# Patient Record
Sex: MALE | Race: WHITE | NOT HISPANIC OR LATINO | Employment: OTHER | URBAN - METROPOLITAN AREA
[De-identification: names, ages, dates, MRNs, and addresses within clinical notes are randomized per-mention and may not be internally consistent; named-entity substitution may affect disease eponyms.]

---

## 2023-09-17 ENCOUNTER — HOSPITAL ENCOUNTER (OUTPATIENT)
Facility: MEDICAL CENTER | Age: 78
End: 2023-09-20
Attending: HOSPITALIST | Admitting: HOSPITALIST
Payer: MEDICARE

## 2023-09-17 PROBLEM — I48.91 ATRIAL FIBRILLATION WITH RAPID VENTRICULAR RESPONSE (HCC): Status: ACTIVE | Noted: 2023-09-17

## 2023-09-17 PROCEDURE — 83735 ASSAY OF MAGNESIUM: CPT | Mod: 91

## 2023-09-17 PROCEDURE — 85025 COMPLETE CBC W/AUTO DIFF WBC: CPT | Mod: 91

## 2023-09-17 PROCEDURE — 84443 ASSAY THYROID STIM HORMONE: CPT | Mod: 91

## 2023-09-17 PROCEDURE — 93005 ELECTROCARDIOGRAM TRACING: CPT | Performed by: STUDENT IN AN ORGANIZED HEALTH CARE EDUCATION/TRAINING PROGRAM

## 2023-09-17 PROCEDURE — 99223 1ST HOSP IP/OBS HIGH 75: CPT | Mod: AI | Performed by: STUDENT IN AN ORGANIZED HEALTH CARE EDUCATION/TRAINING PROGRAM

## 2023-09-17 PROCEDURE — 770020 HCHG ROOM/CARE - TELE (206)

## 2023-09-17 PROCEDURE — 84484 ASSAY OF TROPONIN QUANT: CPT | Mod: 91

## 2023-09-17 PROCEDURE — 80053 COMPREHEN METABOLIC PANEL: CPT | Mod: 91

## 2023-09-17 PROCEDURE — 84439 ASSAY OF FREE THYROXINE: CPT

## 2023-09-17 PROCEDURE — 36415 COLL VENOUS BLD VENIPUNCTURE: CPT

## 2023-09-17 PROCEDURE — 85055 RETICULATED PLATELET ASSAY: CPT

## 2023-09-17 RX ORDER — METOPROLOL TARTRATE 1 MG/ML
5 INJECTION, SOLUTION INTRAVENOUS
Status: DISCONTINUED | OUTPATIENT
Start: 2023-09-17 | End: 2023-09-20 | Stop reason: HOSPADM

## 2023-09-17 RX ORDER — POLYETHYLENE GLYCOL 3350 17 G/17G
1 POWDER, FOR SOLUTION ORAL
Status: DISCONTINUED | OUTPATIENT
Start: 2023-09-17 | End: 2023-09-18

## 2023-09-17 RX ORDER — DEXTROSE MONOHYDRATE 25 G/50ML
25 INJECTION, SOLUTION INTRAVENOUS
Status: DISCONTINUED | OUTPATIENT
Start: 2023-09-17 | End: 2023-09-20 | Stop reason: HOSPADM

## 2023-09-17 RX ORDER — AMOXICILLIN 250 MG
2 CAPSULE ORAL 2 TIMES DAILY
Status: DISCONTINUED | OUTPATIENT
Start: 2023-09-17 | End: 2023-09-18

## 2023-09-17 RX ORDER — BISACODYL 10 MG
10 SUPPOSITORY, RECTAL RECTAL
Status: DISCONTINUED | OUTPATIENT
Start: 2023-09-17 | End: 2023-09-18

## 2023-09-17 ASSESSMENT — COGNITIVE AND FUNCTIONAL STATUS - GENERAL
DRESSING REGULAR UPPER BODY CLOTHING: A LITTLE
DRESSING REGULAR LOWER BODY CLOTHING: A LITTLE
DAILY ACTIVITIY SCORE: 20
MOVING FROM LYING ON BACK TO SITTING ON SIDE OF FLAT BED: A LITTLE
TOILETING: A LITTLE
MOBILITY SCORE: 18
SUGGESTED CMS G CODE MODIFIER DAILY ACTIVITY: CJ
SUGGESTED CMS G CODE MODIFIER MOBILITY: CK
CLIMB 3 TO 5 STEPS WITH RAILING: A LOT
WALKING IN HOSPITAL ROOM: A LITTLE
STANDING UP FROM CHAIR USING ARMS: A LITTLE
HELP NEEDED FOR BATHING: A LITTLE
MOVING TO AND FROM BED TO CHAIR: A LITTLE

## 2023-09-17 ASSESSMENT — PATIENT HEALTH QUESTIONNAIRE - PHQ9
2. FEELING DOWN, DEPRESSED, IRRITABLE, OR HOPELESS: NOT AT ALL
1. LITTLE INTEREST OR PLEASURE IN DOING THINGS: NOT AT ALL
SUM OF ALL RESPONSES TO PHQ9 QUESTIONS 1 AND 2: 0

## 2023-09-17 NOTE — PROGRESS NOTES
[facility] DIRECT ADMISSION REPORT  Transferring facility: [facility]  Transferring physician: Dr Belem Puri      Chief complaint: Atrial fibrillation with rapid ventricular response, concern for V. tach, heart block,   Pertinent history & patient course: 77 years old male with a past medical history of atrial fibrillation, diabetes, hypothyroidism, hypertension who presented with abdominal pain generalized weakness, lethargy and palpitations who was found to be having atrial fibrillation/flutter with rapid ventricular response with heart rate reaching up to 205.  Heart rate did improve with metoprolol pushes, lethargy, and abdominal pain resolved after heart rate improved.  The patient has been started on Zosyn for potential ischemic/infectious colitis.  CT abdomen non diagnostic.        Code Status: FULL per transferring provider, I personally verified with the transferring provider patient's code status and the transferring provider has confirmed this with the patient.  Reason for Transfer: Needed inpatient cardiology consultation  Further work up or recommendations requested prior to transfer: Infectious work-up, including rule out C. difficile colitis, check UA, check chest x-ray     Patient accepted for transfer: Yes  Accepting [facility] Facility: [facility] to notify the Triage Coordinator in the RTOC via Voalte or Phone ext. [phone] when patient arrives to the unit. The Triage Coordinator will assign the admitting provider.     Consultants to be called upon arrival: Cardiology  Admission status: Inpatient.   Floor requested: Telemetry     The admitting provider is the point of contact for questions or concerns regarding patient's care.

## 2023-09-18 ENCOUNTER — APPOINTMENT (OUTPATIENT)
Dept: RADIOLOGY | Facility: MEDICAL CENTER | Age: 78
End: 2023-09-18
Attending: STUDENT IN AN ORGANIZED HEALTH CARE EDUCATION/TRAINING PROGRAM
Payer: MEDICARE

## 2023-09-18 PROBLEM — C90.00 MULTIPLE MYELOMA (HCC): Status: ACTIVE | Noted: 2023-09-18

## 2023-09-18 PROBLEM — R19.7 DIARRHEA: Status: ACTIVE | Noted: 2023-09-18

## 2023-09-18 PROBLEM — I50.22 CHRONIC SYSTOLIC CONGESTIVE HEART FAILURE (HCC): Status: ACTIVE | Noted: 2023-09-18

## 2023-09-18 PROBLEM — R11.2 NAUSEA & VOMITING: Status: ACTIVE | Noted: 2023-09-18

## 2023-09-18 PROBLEM — E11.9 DIABETES MELLITUS (HCC): Status: ACTIVE | Noted: 2023-09-18

## 2023-09-18 LAB
ALBUMIN SERPL BCP-MCNC: 3.4 G/DL (ref 3.2–4.9)
ALBUMIN/GLOB SERPL: 1.2 G/DL
ALP SERPL-CCNC: 97 U/L (ref 30–99)
ALT SERPL-CCNC: 8 U/L (ref 2–50)
ANION GAP SERPL CALC-SCNC: 12 MMOL/L (ref 7–16)
ANION GAP SERPL CALC-SCNC: 15 MMOL/L (ref 7–16)
AST SERPL-CCNC: 15 U/L (ref 12–45)
BASOPHILS # BLD AUTO: 0.5 % (ref 0–1.8)
BASOPHILS # BLD: 0.03 K/UL (ref 0–0.12)
BILIRUB SERPL-MCNC: 2.3 MG/DL (ref 0.1–1.5)
BUN SERPL-MCNC: 33 MG/DL (ref 8–22)
BUN SERPL-MCNC: 37 MG/DL (ref 8–22)
CALCIUM ALBUM COR SERPL-MCNC: 8.8 MG/DL (ref 8.5–10.5)
CALCIUM SERPL-MCNC: 8.2 MG/DL (ref 8.5–10.5)
CALCIUM SERPL-MCNC: 8.3 MG/DL (ref 8.5–10.5)
CHLORIDE SERPL-SCNC: 111 MMOL/L (ref 96–112)
CHLORIDE SERPL-SCNC: 112 MMOL/L (ref 96–112)
CO2 SERPL-SCNC: 14 MMOL/L (ref 20–33)
CO2 SERPL-SCNC: 15 MMOL/L (ref 20–33)
CREAT SERPL-MCNC: 1.43 MG/DL (ref 0.5–1.4)
CREAT SERPL-MCNC: 1.62 MG/DL (ref 0.5–1.4)
EKG IMPRESSION: NORMAL
EOSINOPHIL # BLD AUTO: 0.07 K/UL (ref 0–0.51)
EOSINOPHIL NFR BLD: 1.2 % (ref 0–6.9)
ERYTHROCYTE [DISTWIDTH] IN BLOOD BY AUTOMATED COUNT: 55.9 FL (ref 35.9–50)
GFR SERPLBLD CREATININE-BSD FMLA CKD-EPI: 43 ML/MIN/1.73 M 2
GFR SERPLBLD CREATININE-BSD FMLA CKD-EPI: 50 ML/MIN/1.73 M 2
GLOBULIN SER CALC-MCNC: 2.9 G/DL (ref 1.9–3.5)
GLUCOSE BLD STRIP.AUTO-MCNC: 125 MG/DL (ref 65–99)
GLUCOSE BLD STRIP.AUTO-MCNC: 131 MG/DL (ref 65–99)
GLUCOSE BLD STRIP.AUTO-MCNC: 131 MG/DL (ref 65–99)
GLUCOSE BLD STRIP.AUTO-MCNC: 86 MG/DL (ref 65–99)
GLUCOSE SERPL-MCNC: 111 MG/DL (ref 65–99)
GLUCOSE SERPL-MCNC: 116 MG/DL (ref 65–99)
HCT VFR BLD AUTO: 37.9 % (ref 42–52)
HGB BLD-MCNC: 12.6 G/DL (ref 14–18)
IMM GRANULOCYTES # BLD AUTO: 0.04 K/UL (ref 0–0.11)
IMM GRANULOCYTES NFR BLD AUTO: 0.7 % (ref 0–0.9)
LACTATE SERPL-SCNC: 1.5 MMOL/L (ref 0.5–2)
LYMPHOCYTES # BLD AUTO: 0.48 K/UL (ref 1–4.8)
LYMPHOCYTES NFR BLD: 7.9 % (ref 22–41)
MAGNESIUM SERPL-MCNC: 1.8 MG/DL (ref 1.5–2.5)
MAGNESIUM SERPL-MCNC: 2.6 MG/DL (ref 1.5–2.5)
MCH RBC QN AUTO: 34.9 PG (ref 27–33)
MCHC RBC AUTO-ENTMCNC: 33.2 G/DL (ref 32.3–36.5)
MCV RBC AUTO: 105 FL (ref 81.4–97.8)
MONOCYTES # BLD AUTO: 0.36 K/UL (ref 0–0.85)
MONOCYTES NFR BLD AUTO: 5.9 % (ref 0–13.4)
NEUTROPHILS # BLD AUTO: 5.08 K/UL (ref 1.82–7.42)
NEUTROPHILS NFR BLD: 83.8 % (ref 44–72)
NRBC # BLD AUTO: 0 K/UL
NRBC BLD-RTO: 0 /100 WBC (ref 0–0.2)
PLATELET # BLD AUTO: 70 K/UL (ref 164–446)
PLATELETS.RETICULATED NFR BLD AUTO: 3.8 % (ref 0.6–13.1)
PMV BLD AUTO: 11.4 FL (ref 9–12.9)
POTASSIUM SERPL-SCNC: 4 MMOL/L (ref 3.6–5.5)
POTASSIUM SERPL-SCNC: 4.1 MMOL/L (ref 3.6–5.5)
PROCALCITONIN SERPL-MCNC: 0.29 NG/ML
PROT SERPL-MCNC: 6.3 G/DL (ref 6–8.2)
RBC # BLD AUTO: 3.61 M/UL (ref 4.7–6.1)
SODIUM SERPL-SCNC: 139 MMOL/L (ref 135–145)
SODIUM SERPL-SCNC: 140 MMOL/L (ref 135–145)
T4 FREE SERPL-MCNC: 1.46 NG/DL (ref 0.93–1.7)
TROPONIN T SERPL-MCNC: 22 NG/L (ref 6–19)
TROPONIN T SERPL-MCNC: 26 NG/L (ref 6–19)
TROPONIN T SERPL-MCNC: 26 NG/L (ref 6–19)
TSH SERPL DL<=0.005 MIU/L-ACNC: 0.61 UIU/ML (ref 0.38–5.33)
WBC # BLD AUTO: 6.1 K/UL (ref 4.8–10.8)

## 2023-09-18 PROCEDURE — 83605 ASSAY OF LACTIC ACID: CPT

## 2023-09-18 PROCEDURE — 80048 BASIC METABOLIC PNL TOTAL CA: CPT

## 2023-09-18 PROCEDURE — 700111 HCHG RX REV CODE 636 W/ 250 OVERRIDE (IP): Mod: JZ | Performed by: STUDENT IN AN ORGANIZED HEALTH CARE EDUCATION/TRAINING PROGRAM

## 2023-09-18 PROCEDURE — 770020 HCHG ROOM/CARE - TELE (206)

## 2023-09-18 PROCEDURE — 87046 STOOL CULTR AEROBIC BACT EA: CPT

## 2023-09-18 PROCEDURE — 83735 ASSAY OF MAGNESIUM: CPT

## 2023-09-18 PROCEDURE — 84484 ASSAY OF TROPONIN QUANT: CPT | Mod: 91

## 2023-09-18 PROCEDURE — 87045 FECES CULTURE AEROBIC BACT: CPT

## 2023-09-18 PROCEDURE — 700102 HCHG RX REV CODE 250 W/ 637 OVERRIDE(OP): Performed by: STUDENT IN AN ORGANIZED HEALTH CARE EDUCATION/TRAINING PROGRAM

## 2023-09-18 PROCEDURE — 87899 AGENT NOS ASSAY W/OPTIC: CPT

## 2023-09-18 PROCEDURE — 700105 HCHG RX REV CODE 258: Performed by: STUDENT IN AN ORGANIZED HEALTH CARE EDUCATION/TRAINING PROGRAM

## 2023-09-18 PROCEDURE — 71045 X-RAY EXAM CHEST 1 VIEW: CPT

## 2023-09-18 PROCEDURE — 84145 PROCALCITONIN (PCT): CPT

## 2023-09-18 PROCEDURE — A9270 NON-COVERED ITEM OR SERVICE: HCPCS | Performed by: STUDENT IN AN ORGANIZED HEALTH CARE EDUCATION/TRAINING PROGRAM

## 2023-09-18 PROCEDURE — 82962 GLUCOSE BLOOD TEST: CPT

## 2023-09-18 PROCEDURE — 99233 SBSQ HOSP IP/OBS HIGH 50: CPT | Mod: GC | Performed by: INTERNAL MEDICINE

## 2023-09-18 PROCEDURE — 93010 ELECTROCARDIOGRAM REPORT: CPT | Performed by: INTERNAL MEDICINE

## 2023-09-18 PROCEDURE — 36415 COLL VENOUS BLD VENIPUNCTURE: CPT

## 2023-09-18 RX ORDER — CALCITRIOL 0.25 UG/1
0.25 CAPSULE, LIQUID FILLED ORAL DAILY
Status: DISCONTINUED | OUTPATIENT
Start: 2023-09-18 | End: 2023-09-20 | Stop reason: HOSPADM

## 2023-09-18 RX ORDER — LORAZEPAM 2 MG/ML
0.5 INJECTION INTRAMUSCULAR EVERY 4 HOURS PRN
Status: DISCONTINUED | OUTPATIENT
Start: 2023-09-18 | End: 2023-09-20 | Stop reason: HOSPADM

## 2023-09-18 RX ORDER — LOPERAMIDE HYDROCHLORIDE 2 MG/1
2 CAPSULE ORAL 4 TIMES DAILY PRN
Status: DISCONTINUED | OUTPATIENT
Start: 2023-09-18 | End: 2023-09-20 | Stop reason: HOSPADM

## 2023-09-18 RX ORDER — ONDANSETRON 2 MG/ML
4 INJECTION INTRAMUSCULAR; INTRAVENOUS EVERY 4 HOURS PRN
Status: DISCONTINUED | OUTPATIENT
Start: 2023-09-18 | End: 2023-09-18

## 2023-09-18 RX ORDER — MAGNESIUM SULFATE HEPTAHYDRATE 40 MG/ML
2 INJECTION, SOLUTION INTRAVENOUS ONCE
Status: COMPLETED | OUTPATIENT
Start: 2023-09-18 | End: 2023-09-18

## 2023-09-18 RX ORDER — BUDESONIDE AND FORMOTEROL FUMARATE DIHYDRATE 160; 4.5 UG/1; UG/1
2 AEROSOL RESPIRATORY (INHALATION) 2 TIMES DAILY
Status: DISCONTINUED | OUTPATIENT
Start: 2023-09-18 | End: 2023-09-18

## 2023-09-18 RX ORDER — LEVOTHYROXINE SODIUM 0.07 MG/1
75 TABLET ORAL DAILY
Status: DISCONTINUED | OUTPATIENT
Start: 2023-09-18 | End: 2023-09-20 | Stop reason: HOSPADM

## 2023-09-18 RX ORDER — ALLOPURINOL 300 MG/1
150 TABLET ORAL DAILY
Status: DISCONTINUED | OUTPATIENT
Start: 2023-09-18 | End: 2023-09-20 | Stop reason: HOSPADM

## 2023-09-18 RX ORDER — ATORVASTATIN CALCIUM 80 MG/1
80 TABLET, FILM COATED ORAL DAILY
Status: DISCONTINUED | OUTPATIENT
Start: 2023-09-18 | End: 2023-09-20 | Stop reason: HOSPADM

## 2023-09-18 RX ORDER — SODIUM CHLORIDE, SODIUM LACTATE, POTASSIUM CHLORIDE, CALCIUM CHLORIDE 600; 310; 30; 20 MG/100ML; MG/100ML; MG/100ML; MG/100ML
INJECTION, SOLUTION INTRAVENOUS ONCE
Status: COMPLETED | OUTPATIENT
Start: 2023-09-18 | End: 2023-09-18

## 2023-09-18 RX ORDER — METOPROLOL SUCCINATE 50 MG/1
50 TABLET, EXTENDED RELEASE ORAL DAILY
Status: DISCONTINUED | OUTPATIENT
Start: 2023-09-18 | End: 2023-09-20 | Stop reason: HOSPADM

## 2023-09-18 RX ORDER — GABAPENTIN 400 MG/1
400 CAPSULE ORAL EVERY EVENING
Status: DISCONTINUED | OUTPATIENT
Start: 2023-09-18 | End: 2023-09-20 | Stop reason: HOSPADM

## 2023-09-18 RX ADMIN — APIXABAN 5 MG: 5 TABLET, FILM COATED ORAL at 17:40

## 2023-09-18 RX ADMIN — MAGNESIUM SULFATE HEPTAHYDRATE 2 G: 2 INJECTION, SOLUTION INTRAVENOUS at 04:00

## 2023-09-18 RX ADMIN — GABAPENTIN 400 MG: 400 CAPSULE ORAL at 17:40

## 2023-09-18 RX ADMIN — ATORVASTATIN CALCIUM 80 MG: 80 TABLET, FILM COATED ORAL at 05:09

## 2023-09-18 RX ADMIN — MOMETASONE FUROATE AND FORMOTEROL FUMARATE DIHYDRATE 2 PUFF: 200; 5 AEROSOL RESPIRATORY (INHALATION) at 17:43

## 2023-09-18 RX ADMIN — CALCITRIOL CAPSULES 0.25 MCG 0.25 MCG: 0.25 CAPSULE ORAL at 05:09

## 2023-09-18 RX ADMIN — LEVOTHYROXINE SODIUM 75 MCG: 0.07 TABLET ORAL at 05:09

## 2023-09-18 RX ADMIN — APIXABAN 5 MG: 5 TABLET, FILM COATED ORAL at 01:28

## 2023-09-18 RX ADMIN — SODIUM CHLORIDE, POTASSIUM CHLORIDE, SODIUM LACTATE AND CALCIUM CHLORIDE: 600; 310; 30; 20 INJECTION, SOLUTION INTRAVENOUS at 04:00

## 2023-09-18 RX ADMIN — METOPROLOL SUCCINATE 50 MG: 50 TABLET, EXTENDED RELEASE ORAL at 05:09

## 2023-09-18 RX ADMIN — ALLOPURINOL 150 MG: 300 TABLET ORAL at 05:09

## 2023-09-18 ASSESSMENT — ENCOUNTER SYMPTOMS
ORTHOPNEA: 0
NERVOUS/ANXIOUS: 0
DIZZINESS: 0
CHILLS: 0
BLOOD IN STOOL: 0
NAUSEA: 1
SHORTNESS OF BREATH: 0
FEVER: 0
VOMITING: 0
HEADACHES: 0
VOMITING: 1
BLURRED VISION: 0
ABDOMINAL PAIN: 1
PALPITATIONS: 0
ABDOMINAL PAIN: 0
DIARRHEA: 1
NAUSEA: 0
COUGH: 0

## 2023-09-18 ASSESSMENT — CHA2DS2 SCORE
VASCULAR DISEASE: NO
AGE 75 OR GREATER: YES
CHA2DS2 VASC SCORE: 4
SEX: MALE
PRIOR STROKE OR TIA OR THROMBOEMBOLISM: NO
HYPERTENSION: NO
DIABETES: YES
CHF OR LEFT VENTRICULAR DYSFUNCTION: YES
AGE 65 TO 74: NO

## 2023-09-18 ASSESSMENT — LIFESTYLE VARIABLES
TOTAL SCORE: 0
ON A TYPICAL DAY WHEN YOU DRINK ALCOHOL HOW MANY DRINKS DO YOU HAVE: 0
TOTAL SCORE: 0
HAVE YOU EVER FELT YOU SHOULD CUT DOWN ON YOUR DRINKING: NO
AVERAGE NUMBER OF DAYS PER WEEK YOU HAVE A DRINK CONTAINING ALCOHOL: 0
CONSUMPTION TOTAL: NEGATIVE
DOES PATIENT WANT TO STOP DRINKING: NO
EVER HAD A DRINK FIRST THING IN THE MORNING TO STEADY YOUR NERVES TO GET RID OF A HANGOVER: NO
TOTAL SCORE: 0
HOW MANY TIMES IN THE PAST YEAR HAVE YOU HAD 5 OR MORE DRINKS IN A DAY: 0
ALCOHOL_USE: NO
EVER FELT BAD OR GUILTY ABOUT YOUR DRINKING: NO
HAVE PEOPLE ANNOYED YOU BY CRITICIZING YOUR DRINKING: NO

## 2023-09-18 ASSESSMENT — FIBROSIS 4 INDEX
FIB4 SCORE: 5.76
FIB4 SCORE: 5.76

## 2023-09-18 NOTE — ASSESSMENT & PLAN NOTE
Patient reports he has high-output ostomy diarrhea since partial colectomy. He states the diarrhea is now at baseline. C.diff (-).  - Loperamide PRN  - Encourage PO intake   - Replete electrolytes as needed

## 2023-09-18 NOTE — ASSESSMENT & PLAN NOTE
Currently not in exacerbation.     - Monitor output  - Continue telemetry monitoring  - Echo  - Continue to hold Entresto and Lasix due to ongoing diarrhea, reconsider tomorrow

## 2023-09-18 NOTE — HOSPITAL COURSE
Jasper Sears is a 77 y.o. male past medical history of CHF with reduced ejection fraction of 25%, diabetes, multiple myeloma (s/p chemotherapy) who presented on 9/17/2023 from an outside facility with lower abdominal pain associated with nausea, vomiting and diarrhea. In the ER at outside facility, he had atrial fibrillation with rapid ventricular response with reports of a flutter and runs of V. tach.  CT of the abdomen pelvis done did not reveal acute abdominal pathology.

## 2023-09-18 NOTE — CARE PLAN
Problem: Knowledge Deficit - Standard  Goal: Patient and family/care givers will demonstrate understanding of plan of care, disease process/condition, diagnostic tests and medications  Outcome: Progressing     Problem: Fall Risk  Goal: Patient will remain free from falls  Outcome: Progressing     Problem: Self Care  Goal: Patient will have the ability to perform ADLs independently or with assistance (bathe, groom, dress, toilet and feed)  Outcome: Progressing   The patient is Stable - Low risk of patient condition declining or worsening    Shift Goals  Clinical Goals: monitor tele  Patient Goals: plan of care  Family Goals: plan of care    Progress made toward(s) clinical / shift goals:  yes    Patient is not progressing towards the following goals:

## 2023-09-18 NOTE — PROGRESS NOTES
Monitor Summary:     Rhythm: Accel Junct  Rate: 96-97  Ectopy: PVC, Couplets  Measurements: --/0.12/0.47      No strip uploaded     12 Hour Chart Check

## 2023-09-18 NOTE — CARE PLAN
The patient is Stable - Low risk of patient condition declining or worsening    Shift Goals  Clinical Goals: Monitor VS/Labs    Progress made toward(s) clinical / shift goals:    Problem: Knowledge Deficit - Standard  Goal: Patient and family/care givers will demonstrate understanding of plan of care, disease process/condition, diagnostic tests and medications  Outcome: Progressing     Problem: Psychosocial  Goal: Patient's level of anxiety will decrease  Outcome: Progressing  Goal: Patient's ability to verbalize feelings about condition will improve  Outcome: Progressing  Goal: Patient's ability to re-evaluate and adapt role responsibilities will improve  Outcome: Progressing  Goal: Patient and family will demonstrate ability to cope with life altering diagnosis and/or procedure  Outcome: Progressing  Goal: Spiritual and cultural needs incorporated into hospitalization  Outcome: Progressing     Problem: Fall Risk  Goal: Patient will remain free from falls  Outcome: Progressing       Patient is not progressing towards the following goals:

## 2023-09-18 NOTE — H&P
Hospital Medicine History & Physical Note    Date of Service  9/17/2023    Primary Care Physician  Pcp Pt States None    Consultants  None    Code Status  Full Code    Chief Complaint  Abdominal pain, a fib with rvr      History of Presenting Illness  Jasper Sears is a 77 y.o. male past medical history of CHF with reduced ejection fraction, diabetes, multiple myeloma who presented 9/17/2023 at outside facility with lower abdominal pain with associated nausea, vomiting and diarrhea.  Patient is visiting from [place] at [place].  Wife reports today they were supposed to check out however patient was too weak and fatigued.  She reports he was recently started on farxiga and entresto.  He was previously on amiodarone which was stopped by his cardiologist as his A-fib was in control.  He has not taken any of his medications today due to intractable nausea and vomiting.  Denies any fevers or chills.    In the ER at outside facility he had atrial fibrillation with rapid ventricular response with reports of a flutter and runs of V. tach.  CT of the abdomen pelvis done did not reveal acute abdominal pathology.  Reportedly C. difficile PCR sent.  He was given 1 L IV fluids, IV magnesium, IV Zosyn 4.5 g and amiodarone 150 mg bolus with metoprolol IV pushes.      Patient examined bedside is sleepy however heart rate is controlled at a rate of 97 with ST depressions in leads V3-V6. He currently denies any chest pain and reports improvement in symptoms.     I discussed the plan of care with patient, family, and ER physician .    Review of Systems  Review of Systems   Gastrointestinal:  Positive for abdominal pain, diarrhea, nausea and vomiting.       Past Medical History   has a past medical history of A-fib (HCC), Congestive heart failure (HCC), Diabetes (HCC), Multiple myeloma (HCC), and Sleep apnea.    Surgical History   has no past surgical history on file.     Family History  family history is not on file.    Family history reviewed with patient. There is no family history that is pertinent to the chief complaint.     Social History   reports that he has never smoked. He has never used smokeless tobacco. He reports that he does not drink alcohol and does not use drugs.    Allergies  Allergies   Allergen Reactions    Tolmetin Unspecified     Other reaction(s): Other (See Comments)  Other reaction(s): Other (See Comments)  Drop  In platelets  CKD  Other reaction(s): Thrombocytopenia  Drop  In platelets  CKD  Drop  In platelets  CKD  Drop  In platelets  CKD      2,4-D Dimethylamine, Amisol Unspecified     Other reaction(s): Unknown/Patient and Family Unable to Define      Diagnostic X-Ray Materials Unspecified     Other reaction(s): Other (See Comments)  Has only 1 functioning kidney  Other reaction(s): Other (See Comments)  Has only 1 functioning kidney  Has only 1 functioning kidney  Other reaction(s): Other (See Comments)  Has only 1 functioning kidney  Has only 1 functioning kidney  Has only 1 functioning kidney  Other reaction(s): Other (See Comments)  Has only 1 functioning kidney  Other reaction(s): Other (See Comments)  Has only 1 functioning kidney  Has only 1 functioning kidney  Has only 1 functioning kidney      Ketorolac      Other reaction(s): Other (See Comments), Other (See Comments)  Other reaction(s): Other (See Comments)  CKD, thrombocytopenia  CKD, thrombocytopenia  Other reaction(s): Other (See Comments)  CKD, thrombocytopenia  CKD, thrombocytopenia  CKD, thrombocytopenia  CKD, thrombocytopenia  Other reaction(s): Other (See Comments)  CKD, thrombocytopenia  CKD, thrombocytopenia  CKD, thrombocytopenia  CKD, thrombocytopenia      Minocycline Unspecified     Other reaction(s): Delerium/Confusion/Psychosis, Other (See Comments)  Other reaction(s): Other (See Comments)  Severe dizziness  Severe dizziness  Severe dizziness  Severe dizziness  Severe dizziness  Severe dizziness  Severe dizziness  Severe  dizziness  Severe dizziness  Severe dizziness      Salicylates Unspecified     Pt states avoid due to blood thinning properties  Pt states avoid due to blood thinning properties      Diphenhydramine      Per pt wife can take orally, but IV was negative in hosptial   Per pt wife can take orally, but IV was negative in hosptial       Metoclopramide      Per pt wife  Per pt wife      Nsaids      intolerance    Ondansetron      Per pt wife   Per pt wife       Prochlorperazine      Per pt wife  Per pt wife      Sulfamethoxazole W-Trimethoprim Unspecified     Other reaction(s): Other (See Comments)  Other reaction(s): Other (See Comments)  Hypoglycemia  Hypoglycemia  Hypoglycemia  Hypoglycemia         Medications  Prior to Admission Medications   Prescriptions Last Dose Informant Patient Reported? Taking?   Calcium Carbonate 1500 (600 Ca) MG Tab   Yes No   Sig: Take 1,500 mg by mouth.   Darbepoetin Mathew 10 MCG/0.4ML Solution Prefilled Syringe   Yes No   Sig: Inject 100 mcg under the skin.   Icosapent Ethyl 1 g Cap   Yes No   Sig: Take 2 g by mouth.   Lenalidomide 10 MG Cap   Yes No   Sig: Take 5 mg by mouth every day.   MELATONIN PO   Yes No   Sig: Take 5 mg by mouth.   Magnesium 400 MG Tab   Yes No   Sig: Take 1 Tablet by mouth 2 times a day.   SITagliptin (JANUVIA) 100 MG Tab   Yes No   Sig: Take 1 tablet (100 mg total) by mouth daily.   SITagliptin (JANUVIA) 50 MG Tab   Yes No   Sig: Take 50 mg by mouth every day.   acyclovir (ZOVIRAX) 400 MG tablet   Yes No   Sig: Take 400 mg by mouth.   allopurinol (ZYLOPRIM) 100 MG Tab   Yes No   Sig: Take 150 mg by mouth every day.   allopurinol (ZYLOPRIM) 300 MG Tab   Yes No   Sig: Take 150 mg by mouth every day.   amiodarone (CORDARONE) 100 MG tablet   Yes No   Sig: Take 100 mg by mouth every day.   apixaban (ELIQUIS) 5mg Tab   Yes No   Sig: Take 5 mg by mouth.   atorvastatin (LIPITOR) 40 MG Tab   Yes No   Sig: Take 80 mg by mouth every day.   atorvastatin (LIPITOR) 80 MG tablet    Yes No   Sig: Take 80 mg by mouth.   budesonide-formoterol (SYMBICORT) 160-4.5 MCG/ACT Aerosol   Yes No   Sig: Inhale 2 Puffs.   budesonide-formoterol (SYMBICORT) 160-4.5 MCG/ACT Aerosol   Yes No   Sig: Inhale 2 puffs into the lungs 2 (two) times a day.   calcitRIOL (ROCALTROL) 0.25 MCG Cap   Yes No   Sig: Take 0.25 mcg by mouth every day.   cyanocobalamin (VITAMIN B12) 1000 MCG Tab   Yes No   Sig: Take 500 mcg by mouth every day.   cyanocobalamin (VITAMIN B12) 500 MCG tablet   Yes No   Sig: Take 500 mcg by mouth every day.   dapagliflozin propanediol (FARXIGA) 10 MG Tab   Yes No   Sig: Take 10 mg by mouth every day.   denosumab (PROLIA) 60 MG/ML Solution Prefilled Syringe injection   Yes No   Sig: Inject 60 mg under the skin.   denosumab (PROLIA) 60 MG/ML Solution Prefilled Syringe injection   Yes No   Sig: Inject 60 mg under the skin.   famotidine (PEPCID) 20 MG Tab   Yes No   Sig: Take 1 Tablet by mouth 2 times a day.   ferrous sulfate 325 (65 Fe) MG EC tablet   Yes No   Sig: Take 325 mg by mouth every day.   fluticasone (FLONASE) 50 MCG/ACT nasal spray   Yes No   Sig: Administer 1 Spray into affected nostril(S) every day.   furosemide (LASIX) 20 MG Tab   Yes No   Sig: Take 20 mg by mouth.   gabapentin (NEURONTIN) 400 MG Cap   Yes No   Sig: Take 400 mg by mouth.   glimepiride (AMARYL) 1 MG tablet   Yes No   Sig: Take 1 tablet (1 mg total) by mouth 2 (two) times a day as needed (usually takes 1mg at hs and 4mg in am).   glimepiride (AMARYL) 4 MG Tab   Yes No   Sig: Take 1 tablet (4 mg total) by mouth 2 (two) times a day as needed.   isosorbide mononitrate SR (IMDUR) 60 MG TABLET SR 24 HR   Yes No   Sig: Take 1 tablet (60 mg total) by mouth every evening.   levothyroxine (SYNTHROID) 75 MCG Tab   Yes No   Sig: Take 75 mcg by mouth every day.   loperamide (IMODIUM) 2 MG Cap   Yes No   Sig: Take 2 mg by mouth.   metoprolol SR (TOPROL XL) 50 MG TABLET SR 24 HR   Yes No   Sig: Take 50 mg by mouth every day.  "  oxybutynin (DITROPAN) 5 MG Tab   Yes No   Sig: Take 1 Tablet by mouth every day.   pantoprazole (PROTONIX) 40 MG Tablet Delayed Response   Yes No   Sig: TAKE 1 TABLET BY MOUTH TWICE  DAILY 1/2 HOUR BEFORE BREAKFAST  AND DINNER   sacubitril-valsartan (ENTRESTO) 24-26 MG Tab   Yes No   Sig: Take 1 Tablet by mouth 2 times a day.   sertraline (ZOLOFT) 50 MG Tab   Yes No   Sig: Take 1 tablet (50 mg total) by mouth daily. 1 1/2 tablets   zinc sulfate (ZINCATE) 220 (50 Zn) MG Cap   Yes No   Sig: Take 220 mg by mouth every day.      Facility-Administered Medications: None       Physical Exam  Temp:  [36.7 °C (98.1 °F)-38 °C (100.4 °F)] 36.7 °C (98.1 °F)  Pulse:  [] 98  Resp:  [10-41] 20  BP: (108-135)/(60-96) 114/75  SpO2:  [88 %-98 %] 92 %  Blood Pressure : 114/75   Temperature: 36.7 °C (98.1 °F)   Pulse: 98   Respiration: 20   Pulse Oximetry: 92 %       Physical Exam    Laboratory:  Recent Labs     09/17/23  1328   WBC 8.4   RBC 4.12*   HEMOGLOBIN 14.3   HEMATOCRIT 43.7   .1*   MCH 34.7*   MCHC 32.7*   RDW 14.4   PLATELETCT 76*   MPV 11.2*     Recent Labs     09/17/23  1328   SODIUM 145   POTASSIUM 4.4   CHLORIDE 114*   CO2 17*   GLUCOSE 163*   BUN 41*   CREATININE 1.8*   CALCIUM 9.0     Recent Labs     09/17/23  1328   ALTSGPT 20   ASTSGOT 30   ALKPHOSPHAT 119   TBILIRUBIN 2.8*   LIPASE 38   GLUCOSE 163*     Recent Labs     09/17/23  1328   APTT 30.1   INR 1.09     Recent Labs     09/17/23  1328   NTPROBNP 4120*         No results for input(s): \"TROPONINT\" in the last 72 hours.    Imaging:  EC-ECHOCARDIOGRAM COMPLETE W/O CONT    (Results Pending)       EKG:  I have personally reviewed the images and compared with prior images.    Assessment/Plan:  Justification for Admission Status  I anticipate this patient will require at least two midnights for appropriate medical management, necessitating inpatient admission because a fib with rvr     Patient will need a Telemetry bed on CARDIOLOGY service .  The need " is secondary to see above.    * Atrial fibrillation with rapid ventricular response (HCC)- (present on admission)  Assessment & Plan  Resolved    Continue with telemetry monitoring  Increase metoprolol to 50 mg XL  Check TSH with reflex T4  IV magnesium given at outside facility.  Check magnesium levels  1 L IV fluid given at outside facility.  We will hold further fluids given history of CHF  Echocardiogram  IV metoprolol as needed for heart rates above 110  Cards eval in am    Diarrhea  Assessment & Plan  F/u C. Diff studies sent at OSH  CT A/P no evidence of colitis  Check procal  Check repeat lactic acid    Multiple myeloma (HCC)  Assessment & Plan  [facility]     Nausea & vomiting  Assessment & Plan  IV antiemetics  IV fluid 1 L NS given at outside facility    Diabetes mellitus (HCC)  Assessment & Plan  Sliding scale with Accu-Cheks and hypoglycemia protocol    Chronic systolic congestive heart failure (HCC)  Assessment & Plan  Monitor output  Continue telemetry monitoring  Check echocardiogram  Continue with Eliquis and statin  Hold Entresto, Imdur given diarrhea, nausea and vomiting        VTE prophylaxis: SCDs/TEDs and therapeutic anticoagulation with eliquis

## 2023-09-18 NOTE — PROGRESS NOTES
4 Eyes Skin Assessment Completed by NICOLAS Lopez and NICOLAS Rincon.    Head WDL  Ears WDL  Nose WDL  Mouth WDL  Neck WDL  Breast/Chest Scar redness where pad was  Shoulder Blades Redness where defib pads were placed by other facility  Spine WDL  (R) Arm/Elbow/Hand WDL  (L) Arm/Elbow/Hand WDL  Abdomen WDL LLQ has a ostomy  Groin WDL  Scrotum/Coccyx/Buttocks WDL  (R) Leg WDL  (L) Leg WDL  (R) Heel/Foot/Toe WDL  (L) Heel/Foot/Toe WDL          Devices In Places Tele Box      Interventions In Place N/A    Possible Skin Injury No    Pictures Uploaded Into Epic N/A  Wound Consult Placed N/A  RN Wound Prevention Protocol Ordered No

## 2023-09-18 NOTE — NON-PROVIDER
Medical Student PROGRESS NOTE    CC: Jasper Sears is a 78 YO male admitted on 9/17/2023 with atrial fibrillation w/ RVR secondary to hypovolemia from vomiting and diarrhea.    OVERVIEW  Hospital Course:  Jasper Sears is a 78 YO male with past medical history of CHF with reduced ejection fraction of 25%, diabetes, multiple myeloma (s/p chemotherapy) who presented on 9/17/2023 from an outside facility with lower abdominal pain associated with nausea, vomiting and diarrhea. In the ER at outside facility, he had atrial fibrillation with rapid ventricular response with reports of a flutter and runs of V. tach.  CT of the abdomen pelvis did not reveal acute abdominal pathology. C. Diff PCR negative. In the ED, patient received IV fluids, magnesium, Zosyn, amiodarone 150 mg bolus, and metoprolol IV pushes.    SUBJECTIVE  On 9/17, patient started having nausea, vomiting, and diarrhea. He states that he ate eggrolls on 9/16 that he believes caused food poisoning. Him and his wife are visiting from [place]. Today, his nausea and vomiting are resolving but he is still having liquid, loose stools and is fatigued. He has a colostomy from a colonic resection 2 years ago. 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 He is on 2.5 L NC, however does not currently have respiratory complaints and has no O2 requirement at baseline.    Per patient's wife, his amiodarone for a. fib was discontinued 1 year ago because his condition was stable. On chart review, metoprolol was discontinued due to bradycardia while in sinus and amiodarone was decreased due to QT prolongation (8/2/2023).    Patient denies chest pain, palpitations, dyspnea, fever, chills, hematuria, and hematochezia.    ROS: All other systems were reviewed and otherwise negative aside from mentioned above    OBJECTIVE  Vital Signs: Last Filed Vitals Signs: 24 Hour Range  /78    Pulse 98   Temp 36.5 °C (97.7 °F) (Temporal)   Resp 18   Wt 65.6 kg (144 lb 10 oz)   SpO2 91%     Physical Examination:  General: A&Ox3, NAD, non-toxic  HEENT: Normocephalic, no conjunctival injection, no scleral icterus  Cardiovascular: Normal rate, no murmurs  Respiratory: CTAB, no wheezes, no accessory muscle use  Gastrointestinal: Ostomy bag with brown, watery stool, no blood; abdomen soft, NT, diffuse lower abdominal tenderness not increased with palpation of all 4 quadrants, BS+   Extremities:  no JAVI  Skin: No rashes in visible areas, warm and dry  Psychiatric: Mood/affect normal, no delirium  Neurologic: CN grossly intact, sensation is symmetrical bilaterally    Laboratory Studies:  Recent Labs     09/17/23  1328 09/17/23  2343   WBC 8.4 6.1   RBC 4.12* 3.61*   HEMOGLOBIN 14.3 12.6*   HEMATOCRIT 43.7 37.9*   .1* 105.0*   MCH 34.7* 34.9*   RDW 14.4 55.9*   PLATELETCT 76* 70*   MPV 11.2* 11.4   NEUTSPOLYS  --  83.80*   LYMPHOCYTES  --  7.90*   MONOCYTES  --  5.90   EOSINOPHILS  --  1.20   BASOPHILS  --  0.50     Recent Labs     09/17/23  1328 09/17/23  2343 09/18/23  0550   SODIUM 145 139 140   POTASSIUM 4.4 4.1 4.0   CHLORIDE 114* 112 111   CO2 17* 15* 14*   GLUCOSE 163* 111* 116*   BUN 41* 37* 33*   CREATININE 1.8* 1.62* 1.43*     DX-CHEST-PORTABLE (1 VIEW)   Final Result      Small amount of right medial hazy basilar opacity, which could represent a small amount of parenchymal opacification or layering pleural fluid.      EC-ECHOCARDIOGRAM COMPLETE W/O CONT    (Results Pending)     Medications:  Medications were reviewed and updated     Assessment and Plan:  Jasper Sears is a 77 y.o. male w/ h/o CHF w/ reduced EF of 25%, diabetes, multiple myeloma admitted 9/17/2023 for a. fib w/ RVR  secondary to hypovolemia from diarrhea and vomiting.    Patient Active Hospital Problem List:       Atrial fibrillation with rapid ventricular response (HCC) (9/17/2023)           Assessment: Patient presented to  outside facility for nausea, vomiting, and diarrhea and was found to be in a. Fib w/ RVR likely secondary to hypovolemia. Patient has a long history of atrial fibrillation. Per chart review, amiodarone and metoprolol were discontinued due to QT prolongation and bradycardia while in sinus, respectively.This is resolving. On telemetry, patient is in accelerated junctional rhythm, however, no there were no episodes of a. fib and he denies any symptoms of chest pain or palpitations.        Plan:   -Continue telemetry monitoring   -Continue metoprolol to 50 mg XL   -PRN IV metoprolol for HR > 110   -Monitor electrolytes   -Pending ECHO results        Diarrhea (9/18/2023)           Assessment: Patient has continued loose bowel movements since 9/17 likely secondary to food poisoning. C. Diff PCR is negative and there is no acute pathology noted on CT of abdomen and pelvis. Mesenteric ischemia is on the differential as patient has history of atrial fibrillation and CHF with reduced EF, however it is less likely as there were unremarkable physical exam findings and patient has been compliant with Eliquis.        Plan:    -Encourage intake of oral fluids   -PRN Loperamide   -Electrolyte repletion as needed         Chronic systolic congestive heart failure (HCC) (9/18/2023)           Assessment: Patient is not in exacerbation and there are no signs of volume overload.        Plan:    -Monitor output and volume status   -Continue telemetry monitoring   -Pending ECHO results   -Continue to hold Entresto and Lasix due to hypovolemic state         Diabetes mellitus (HCC) (9/18/2023)           Assessment: Last A1c 7.2 on 3/13/23.        Plan:    -Sliding scale, Accu-Cheks    -Hypoglycemia protocol         Nausea & vomiting (9/18/2023)           Assessment: Likely secondary to food poisoning, resolving.   -Encourage intake of oral fluids   -Electrolyte repletion as needed               Multiple myeloma (HCC) (9/18/2023)            Assessment: History of MM        Plan:    -Follow outpatient in [place]    Diet: Diabetic  Fluids: Oral intake  PPx: Eliquis    Code: Full    Dispo: The patient is not medically cleared for discharge to home or a post-acute facility.       Ingrid Sanchez, Student Medical Student III

## 2023-09-18 NOTE — PROGRESS NOTES
[facility] Internal Medicine Daily Progress Note    Date of Service  9/18/2023    R Team: R IM Green Team   Attending: Antonio Addison M.d.  Senior Resident: Dr. Hair   Intern:  Dr. Bojorquez   Contact Number: [phone]    Chief Complaint  Jasper Seasr is a 77 y.o. male admitted 9/17/2023 with a.fib with RVR 2/2 hypovolemia.     Hospital Course  Jasper Sears is a 77 y.o. male past medical history of CHF with reduced ejection fraction of 25%, diabetes, multiple myeloma (s/p chemotherapy) who presented on 9/17/2023 from an outside facility with lower abdominal pain associated with nausea, vomiting and diarrhea. In the ER at outside facility, he had atrial fibrillation with rapid ventricular response with reports of a flutter and runs of V. tach.  CT of the abdomen pelvis done did not reveal acute abdominal pathology.      Interval Problem Update  NAEO. Patient reports he had egg rolls yesterday and since then developed nausea,vomiting and diarrhea. He states his nausea and vomiting are resolving but he continues to have some soft bowel movements. He denies any chest pain, palpitations or shortness of breath. Assessment and plan discussed with patient's wife at bedside after permission was granted.   - C.diff (-), started PRN loperamide  - No episodes of a.fib on tele     I have discussed this patient's plan of care and discharge plan at IDT rounds today with Case Management, Nursing, Nursing leadership, and other members of the IDT team.    Consultants/Specialty  None    Code Status  Full Code    Disposition  The patient is not medically cleared for discharge to home or a post-acute facility.      I have placed the appropriate orders for post-discharge needs.    Review of Systems  Review of Systems   Constitutional:  Positive for malaise/fatigue. Negative for chills and fever.   Eyes:  Negative for blurred vision.   Respiratory:  Negative for cough and shortness of breath.    Cardiovascular:  Negative for chest  pain, palpitations, orthopnea and leg swelling.   Gastrointestinal:  Positive for diarrhea. Negative for abdominal pain, blood in stool, melena, nausea and vomiting.   Neurological:  Negative for dizziness and headaches.   Psychiatric/Behavioral:  The patient is not nervous/anxious.         Physical Exam  Temp:  [36.5 °C (97.7 °F)-38 °C (100.4 °F)] 36.5 °C (97.7 °F)  Pulse:  [] 98  Resp:  [10-41] 18  BP: (108-135)/(60-96) 115/78  SpO2:  [88 %-98 %] 91 %    Physical Exam  HENT:      Head: Normocephalic.      Mouth/Throat:      Mouth: Mucous membranes are moist.   Eyes:      General: No scleral icterus.        Right eye: No discharge.         Left eye: No discharge.   Cardiovascular:      Rate and Rhythm: Normal rate.      Pulses: Normal pulses.      Heart sounds: Normal heart sounds.   Pulmonary:      Effort: Pulmonary effort is normal.      Breath sounds: Normal breath sounds.   Abdominal:      General: There is no distension.      Tenderness: There is no abdominal tenderness.      Comments: Ostomy bag demonstrating some watery stools    Musculoskeletal:         General: No swelling.      Cervical back: Normal range of motion.      Right lower leg: No edema.      Left lower leg: No edema.   Skin:     General: Skin is warm and dry.   Neurological:      Mental Status: He is alert and oriented to person, place, and time.   Psychiatric:         Mood and Affect: Mood normal.         Fluids    Intake/Output Summary (Last 24 hours) at 9/18/2023 1203  Last data filed at 9/18/2023 1000  Gross per 24 hour   Intake --   Output 500 ml   Net -500 ml       Laboratory  Recent Labs     09/17/23 1328 09/17/23 2343   WBC 8.4 6.1   RBC 4.12* 3.61*   HEMOGLOBIN 14.3 12.6*   HEMATOCRIT 43.7 37.9*   .1* 105.0*   MCH 34.7* 34.9*   MCHC 32.7* 33.2   RDW 14.4 55.9*   PLATELETCT 76* 70*   MPV 11.2* 11.4     Recent Labs     09/17/23  1328 09/17/23  2343 09/18/23  0550   SODIUM 145 139 140   POTASSIUM 4.4 4.1 4.0   CHLORIDE 114*  112 111   CO2 17* 15* 14*   GLUCOSE 163* 111* 116*   BUN 41* 37* 33*   CREATININE 1.8* 1.62* 1.43*   CALCIUM 9.0 8.3* 8.2*     Recent Labs     09/17/23  1328   APTT 30.1   INR 1.09               Imaging  DX-CHEST-PORTABLE (1 VIEW)   Final Result      Small amount of right medial hazy basilar opacity, which could represent a small amount of parenchymal opacification or layering pleural fluid.      EC-ECHOCARDIOGRAM COMPLETE W/O CONT    (Results Pending)        Assessment/Plan  Problem Representation:    * Atrial fibrillation with rapid ventricular response (HCC)- (present on admission)  Assessment & Plan  Resolving, likely 2/2 hypovolemia. Per chart review, patient had a.flutter with amiodarone previously.     - Continue with telemetry monitoring  - Increase metoprolol to 50 mg XL  - Repeat TSH wnl  - IV metoprolol needed for heart rates above 110  - Echocardiogram     Diarrhea  Assessment & Plan  Likely 2/2 to food poisoning from yesterday. There is very low suspicion for ischemic colitis given he has been compliant with Eliquis and physical exam is unremarkable. C.diff (-).  - Loperamide PRN  - Encourage PO intake   - Replete electrolytes as needed     Multiple myeloma (HCC)  Assessment & Plan  Has history of MM, follows on an outpatient basis in [place].     Nausea & vomiting  Assessment & Plan  Resolving, likely due to food poisoning     Diabetes mellitus (HCC)  Assessment & Plan  Sliding scale with Accu-Cheks and hypoglycemia protocol    Chronic systolic congestive heart failure (HCC)  Assessment & Plan  Currently not in exacerbation.     - Monitor output  - Continue telemetry monitoring  - Echo  - Continue to hold Entresto and Lasix due to ongoing diarrhea, reconsider tomorrow          VTE prophylaxis: Eliquis     I have performed a physical exam and reviewed and updated ROS and Plan today (9/18/2023). In review of yesterday's note (9/17/2023), there are no changes except as documented above.

## 2023-09-18 NOTE — ASSESSMENT & PLAN NOTE
Resolving, likely 2/2 hypovolemia. Per chart review, patient had a.flutter with amiodarone previously. Echocardiogram demonstrated concern for severe AS which could also be precipitating a.fib.     - Continue with telemetry monitoring  - Continue metoprolol to 50 mg XL  - Repeat TSH wnl  - IV metoprolol needed for heart rates above 110  - Plan for cardiology consult in am

## 2023-09-19 ENCOUNTER — APPOINTMENT (OUTPATIENT)
Dept: CARDIOLOGY | Facility: MEDICAL CENTER | Age: 78
End: 2023-09-19
Payer: MEDICARE

## 2023-09-19 ENCOUNTER — APPOINTMENT (OUTPATIENT)
Dept: RADIOLOGY | Facility: MEDICAL CENTER | Age: 78
End: 2023-09-19
Attending: STUDENT IN AN ORGANIZED HEALTH CARE EDUCATION/TRAINING PROGRAM
Payer: MEDICARE

## 2023-09-19 LAB
ALBUMIN SERPL BCP-MCNC: 3.3 G/DL (ref 3.2–4.9)
ALBUMIN/GLOB SERPL: 1.3 G/DL
ALP SERPL-CCNC: 98 U/L (ref 30–99)
ALT SERPL-CCNC: 9 U/L (ref 2–50)
ANION GAP SERPL CALC-SCNC: 10 MMOL/L (ref 7–16)
AST SERPL-CCNC: 15 U/L (ref 12–45)
BILIRUB SERPL-MCNC: 1.3 MG/DL (ref 0.1–1.5)
BUN SERPL-MCNC: 32 MG/DL (ref 8–22)
CALCIUM ALBUM COR SERPL-MCNC: 9 MG/DL (ref 8.5–10.5)
CALCIUM SERPL-MCNC: 8.4 MG/DL (ref 8.5–10.5)
CHLORIDE SERPL-SCNC: 111 MMOL/L (ref 96–112)
CO2 SERPL-SCNC: 19 MMOL/L (ref 20–33)
CREAT SERPL-MCNC: 1.74 MG/DL (ref 0.5–1.4)
E COLI SXT1+2 STL IA: NORMAL
GFR SERPLBLD CREATININE-BSD FMLA CKD-EPI: 40 ML/MIN/1.73 M 2
GLOBULIN SER CALC-MCNC: 2.6 G/DL (ref 1.9–3.5)
GLUCOSE BLD STRIP.AUTO-MCNC: 127 MG/DL (ref 65–99)
GLUCOSE BLD STRIP.AUTO-MCNC: 136 MG/DL (ref 65–99)
GLUCOSE BLD STRIP.AUTO-MCNC: 141 MG/DL (ref 65–99)
GLUCOSE SERPL-MCNC: 119 MG/DL (ref 65–99)
LV EJECT FRACT  99904: 20
LV EJECT FRACT MOD 2C 99903: 23.09
LV EJECT FRACT MOD 4C 99902: 27.53
LV EJECT FRACT MOD BP 99901: 26.7
MAGNESIUM SERPL-MCNC: 2.2 MG/DL (ref 1.5–2.5)
POTASSIUM SERPL-SCNC: 4.2 MMOL/L (ref 3.6–5.5)
PROT SERPL-MCNC: 5.9 G/DL (ref 6–8.2)
SIGNIFICANT IND 70042: NORMAL
SITE SITE: NORMAL
SODIUM SERPL-SCNC: 140 MMOL/L (ref 135–145)
SOURCE SOURCE: NORMAL

## 2023-09-19 PROCEDURE — 93306 TTE W/DOPPLER COMPLETE: CPT

## 2023-09-19 PROCEDURE — 80053 COMPREHEN METABOLIC PANEL: CPT

## 2023-09-19 PROCEDURE — 82962 GLUCOSE BLOOD TEST: CPT | Mod: 91

## 2023-09-19 PROCEDURE — 93306 TTE W/DOPPLER COMPLETE: CPT | Mod: 26 | Performed by: INTERNAL MEDICINE

## 2023-09-19 PROCEDURE — 700102 HCHG RX REV CODE 250 W/ 637 OVERRIDE(OP)

## 2023-09-19 PROCEDURE — 71045 X-RAY EXAM CHEST 1 VIEW: CPT

## 2023-09-19 PROCEDURE — A9270 NON-COVERED ITEM OR SERVICE: HCPCS | Performed by: STUDENT IN AN ORGANIZED HEALTH CARE EDUCATION/TRAINING PROGRAM

## 2023-09-19 PROCEDURE — A9270 NON-COVERED ITEM OR SERVICE: HCPCS

## 2023-09-19 PROCEDURE — G0378 HOSPITAL OBSERVATION PER HR: HCPCS

## 2023-09-19 PROCEDURE — 700102 HCHG RX REV CODE 250 W/ 637 OVERRIDE(OP): Performed by: STUDENT IN AN ORGANIZED HEALTH CARE EDUCATION/TRAINING PROGRAM

## 2023-09-19 PROCEDURE — 99233 SBSQ HOSP IP/OBS HIGH 50: CPT | Mod: GC | Performed by: INTERNAL MEDICINE

## 2023-09-19 PROCEDURE — 83735 ASSAY OF MAGNESIUM: CPT

## 2023-09-19 RX ADMIN — MOMETASONE FUROATE AND FORMOTEROL FUMARATE DIHYDRATE 2 PUFF: 200; 5 AEROSOL RESPIRATORY (INHALATION) at 04:59

## 2023-09-19 RX ADMIN — METOPROLOL SUCCINATE 50 MG: 50 TABLET, EXTENDED RELEASE ORAL at 05:01

## 2023-09-19 RX ADMIN — GABAPENTIN 400 MG: 400 CAPSULE ORAL at 17:47

## 2023-09-19 RX ADMIN — ATORVASTATIN CALCIUM 80 MG: 80 TABLET, FILM COATED ORAL at 05:00

## 2023-09-19 RX ADMIN — CALCITRIOL CAPSULES 0.25 MCG 0.25 MCG: 0.25 CAPSULE ORAL at 05:00

## 2023-09-19 RX ADMIN — LEVOTHYROXINE SODIUM 75 MCG: 0.07 TABLET ORAL at 05:01

## 2023-09-19 RX ADMIN — MOMETASONE FUROATE AND FORMOTEROL FUMARATE DIHYDRATE 2 PUFF: 200; 5 AEROSOL RESPIRATORY (INHALATION) at 17:48

## 2023-09-19 RX ADMIN — APIXABAN 5 MG: 5 TABLET, FILM COATED ORAL at 05:00

## 2023-09-19 RX ADMIN — ALLOPURINOL 150 MG: 300 TABLET ORAL at 04:59

## 2023-09-19 RX ADMIN — LOPERAMIDE HYDROCHLORIDE 2 MG: 2 CAPSULE ORAL at 08:30

## 2023-09-19 ASSESSMENT — ENCOUNTER SYMPTOMS
COUGH: 0
CHILLS: 0
DIZZINESS: 0
BLURRED VISION: 0
BLOOD IN STOOL: 0
ABDOMINAL PAIN: 0
FEVER: 0
ORTHOPNEA: 0
SHORTNESS OF BREATH: 0
PALPITATIONS: 0
NAUSEA: 0
HEADACHES: 0
VOMITING: 0
DIARRHEA: 1
NERVOUS/ANXIOUS: 0

## 2023-09-19 ASSESSMENT — PAIN DESCRIPTION - PAIN TYPE: TYPE: ACUTE PAIN

## 2023-09-19 ASSESSMENT — FIBROSIS 4 INDEX: FIB4 SCORE: 5.43

## 2023-09-19 NOTE — PROGRESS NOTES
Monitor Summary  Rhythm: acc junctional   Rate: 93-96  Ectopy: rare pvc, coup   0 / 0.012 / 0

## 2023-09-19 NOTE — DISCHARGE PLANNING
Patient lives in [place] but he and his wife are visiting [place]. He is ready to discharge today, his wife will provide an Uber for both of them.

## 2023-09-19 NOTE — DISCHARGE PLANNING
Patient rolled back to observation/outpatient status per attending   MD determination, Antonio Addison, and UR committee IPA secondary reviewer, Yareli Duque. Condition code 44 completed.

## 2023-09-19 NOTE — CARE PLAN
The patient is Stable - Low risk of patient condition declining or worsening    Shift Goals  Clinical Goals: Monitor heart rhythm/rate  Patient Goals: updates/discharge  Family Goals: discharge    Progress made toward(s) clinical / shift goals:      Problem: Knowledge Deficit - Standard  Goal: Patient and family/care givers will demonstrate understanding of plan of care, disease process/condition, diagnostic tests and medications  Outcome: Progressing     Problem: Psychosocial  Goal: Patient's ability to verbalize feelings about condition will improve  Outcome: Progressing     Problem: Hemodynamics  Goal: Patient's hemodynamics, fluid balance and neurologic status will be stable or improve  Outcome: Progressing     Problem: Gastrointestinal Irritability  Goal: Diarrhea will be absent or improved  Outcome: Progressing       Patient is not progressing towards the following goals:

## 2023-09-19 NOTE — PROGRESS NOTES
Received bedside report from RN, pt care assumed, VSS, pt assessment complete. Pt AAOx4, tele monitor on. No signs of acute distress noted at this time. Plan of care discussed with pt and verbalizes no questions. Pt denies any additional needs at this time. Bed locked/in lowest position, bed alarm on, pt educated on fall risk and verbalized understanding, call light within reach, hourly rounding initiated. Wife at bedside

## 2023-09-19 NOTE — NON-PROVIDER
Medical Student PROGRESS NOTE    CC: Jasper Sears is a 78 YO male admitted on 9/17/2023 with atrial fibrillation w/ RVR secondary to hypovolemia from vomiting and diarrhea.    Consults:  Cardiology    OVERVIEW  Hospital Course:  Jasper Sears is a 78 YO male with past medical history of CHF with reduced ejection fraction of 25%, diabetes, multiple myeloma (s/p chemotherapy) who presented on 9/17/2023 from an outside facility with lower abdominal pain associated with nausea, vomiting and diarrhea. In the ER at outside facility, he had atrial fibrillation with rapid ventricular response with reports of a flutter and runs of V. tach.  CT of the abdomen pelvis did not reveal acute abdominal pathology. C. Diff PCR negative. In the ED, patient received IV fluids, magnesium, Zosyn, amiodarone 150 mg bolus, and metoprolol IV pushes.    SUBJECTIVE  Patient feels well today. He still reports diarrhea, and has been dumping his ostomy bag every couple of hours. Per his wife, his diarrhea is at baseline. He has been eating and drinking well and has also been able to mobilize more. His diffuse lower abdominal pain has resolved. Patient denies nausea, vomiting, urinary changes, weakness, chest pain, palpitations, breathing difficulties, or abdominal pain. He states that he has been urinating every 45 minutes-1 hour.    Patient and wife expressed wanting to leave today. They were advised to stay and understand the risks of leaving AMA.    ROS: All other systems were reviewed and otherwise negative aside from mentioned above    OBJECTIVE  Vital Signs: Last Filed Vitals Signs: 24 Hour Range  /81   Pulse 87   Temp 36.6 °C (97.8 °F) (Temporal)   Resp 18   Wt 72.9 kg (160 lb 11.5 oz)   SpO2 92%     Physical Examination:  General: A&Ox3, NAD, non-toxic  HEENT: Normocephalic, no conjunctival injection  Cardiovascular: RRR, no murmurs  Respiratory: CTAB, no wheezes  Gastrointestinal: ostomy bag present, soft, NT, ND,  BS+  Extremities:  no JAVI  Skin: No rashes in visible areas, warm and dry  Psychiatric: Mood/affect non-depressed, no delirium  Neurologic: Cranial nerves are grossly intact, sensation is symmetrical bilaterally    Laboratory Studies:  Recent Labs     09/17/23  1328 09/17/23  2343   WBC 8.4 6.1   RBC 4.12* 3.61*   HEMOGLOBIN 14.3 12.6*   HEMATOCRIT 43.7 37.9*   .1* 105.0*   MCH 34.7* 34.9*   RDW 14.4 55.9*   PLATELETCT 76* 70*   MPV 11.2* 11.4   NEUTSPOLYS  --  83.80*   LYMPHOCYTES  --  7.90*   MONOCYTES  --  5.90   EOSINOPHILS  --  1.20   BASOPHILS  --  0.50     Recent Labs     09/17/23  2343 09/18/23  0550 09/19/23  0102   SODIUM 139 140 140   POTASSIUM 4.1 4.0 4.2   CHLORIDE 112 111 111   CO2 15* 14* 19*   GLUCOSE 111* 116* 119*   BUN 37* 33* 32*   CREATININE 1.62* 1.43* 1.74*     EC-ECHOCARDIOGRAM COMPLETE W/O CONT         DX-CHEST-PORTABLE (1 VIEW)   Final Result      Stable minimal right basilar opacification      DX-CHEST-PORTABLE (1 VIEW)   Final Result      Small amount of right medial hazy basilar opacity, which could represent a small amount of parenchymal opacification or layering pleural fluid.        Medications:  Medications were reviewed and updated     Assessment and Plan:  Jasper Sears is a 77 y.o. male w/ h/o CHF w/ reduced EF of 25%, diabetes, multiple myeloma admitted 9/17/2023 for a. fib w/ RVR  secondary to hypovolemia from diarrhea and vomiting.    Patient Active Hospital Problem List:       Atrial fibrillation with rapid ventricular response (HCC) (9/17/2023)           Assessment: Patient presented to outside facility for nausea, vomiting, and diarrhea and was found to be in a. Fib w/ RVR likely secondary to hypovolemia. Patient has a long history of atrial fibrillation. Per chart review, amiodarone and metoprolol were discontinued due to QT prolongation and bradycardia while in sinus, respectively.This is resolving. On telemetry, patient is still in accelerated junctional rhythm  (89-98), however, there were no episodes of a. fib and he denies any symptoms of chest pain or palpitations today. ECHO demonstrated concern for severe AS, which may precipitate his atrial fibrillation.        Plan:   -Telemetry monitoring  -Cardiology consult in AM for severe AS on ECHO   -Continue metoprolol 50 mg XL   -IV metoprolol for HR > 110         Diarrhea (9/18/2023)           Assessment: Patient is still having to empty his ostomy bag every few hours. His wife states that the output is his baseline since his partial colectomy. He takes Imodium at home.        Plan:    -Loperamide PRN   -Encourage PO intake   -Replete electrolytes as needed         Chronic systolic congestive heart failure (HCC) (9/18/2023)           Assessment: Patient is not in exacerbation and there are no signs of volume overload. ECHO results show severe AS.        Plan:               -Monitor output and volume status              -Continue telemetry monitoring          Diabetes mellitus (HCC) (9/18/2023)           Assessment: Last A1c 7.2 on 3/13/23.        Plan:               -Sliding scale, Accu-Cheks               -Hypoglycemia protocol          Nausea & vomiting (9/18/2023)           Assessment: Likely secondary to food poisoning, resolving.              -Encourage intake of oral fluids              -Electrolyte repletion as needed               Multiple myeloma (HCC) (9/18/2023)           Assessment: History of MM        Plan:               -Follow outpatient in [place]       Diet: Diabetic  Fluids: Oral intake  PPx: Eliquis     Code: Full     Dispo: The patient is not medically cleared for discharge to home or a post-acute facility.       Ingrid Sanchez, Student Medical Student III

## 2023-09-19 NOTE — DISCHARGE SUMMARY
[facility] Internal Medicine Discharge Summary    Attending: Antonio Addison MD  Senior Resident: Dr. Hair  Intern:  Dr. Bojorquez  Contact Number: [phone]    CHIEF COMPLAINT ON ADMISSION  No chief complaint on file.      Reason for Admission  CHF exacerbation, Afib with RVR     Admission Date  9/17/2023    CODE STATUS  Full Code    HPI & HOSPITAL COURSE  Jasper Sears is a 77 y.o. male past medical history of CHF with reduced ejection fraction, diabetes, multiple myeloma who presented 9/17/2023 at outside facility with lower abdominal pain with associated nausea, vomiting and diarrhea.  Patient is visiting from [place] at [place].  Wife reports he was recently started on farxiga and entresto.  He was previously on amiodarone which was stopped by his cardiologist as his A-fib was in control.  In the ER at outside facility he had atrial fibrillation with rapid ventricular response with reports of a flutter and runs of V. tach.  CT of the abdomen pelvis done did not reveal acute abdominal pathology.  C.Diff negative. During hospital stay patient was atrial flutter rhythm rate in the 90s, no episodes of RVR. Patient was treated with supportive care, held home diuretics.  Repeat Echocardiogram reveled severe aortic stenosis. Cardiology consulted who recommended patient to follow up with his outpatient cardiologist . On day of discharge patient was doing well with no complaints. Will discontinue home Farxiga due to high output ostomy.        Therefore, he is discharged in fair and stable condition to home with close outpatient follow-up.    The patient met 2-midnight criteria for an inpatient stay at the time of discharge.    Discharge Date  09/19/2023    Physical Exam on Day of Discharge  Physical Exam  HENT:      Head: Normocephalic.      Mouth/Throat:      Mouth: Mucous membranes are moist.   Eyes:      General: No scleral icterus.        Right eye: No discharge.         Left eye: No discharge.    Cardiovascular:      Rate and Rhythm: Normal rate.      Pulses: Normal pulses.      Heart sounds: Normal heart sounds.   Pulmonary:      Effort: Pulmonary effort is normal.      Breath sounds: Normal breath sounds.   Abdominal:      General: There is no distension.      Tenderness: There is no abdominal tenderness.      Comments: Ostomy bag demonstrating some watery stools    Musculoskeletal:         General: No swelling.      Cervical back: Normal range of motion.      Right lower leg: No edema.      Left lower leg: No edema.   Skin:     General: Skin is warm and dry.   Neurological:      Mental Status: He is alert and oriented to person, place, and time.   Psychiatric:         Mood and Affect: Mood normal.         FOLLOW UP ITEMS POST DISCHARGE  Primary care, Cardiology    DISCHARGE DIAGNOSES  Principal Problem:    Atrial fibrillation with rapid ventricular response (HCC) (POA: Yes)  Active Problems:    Chronic systolic congestive heart failure (HCC) (POA: Unknown)    Diabetes mellitus (HCC) (POA: Unknown)    Nausea & vomiting (POA: Unknown)    Multiple myeloma (HCC) (POA: Unknown)    Diarrhea (POA: Unknown)    Atypical atrial flutter (HCC) (Chronic) (POA: Yes)    Severe aortic valve stenosis (Chronic) (POA: Yes)  Resolved Problems:    * No resolved hospital problems. *      FOLLOW UP  No future appointments.  Cardiology    Go to  As scheduled at home in [facility]      MEDICATIONS ON DISCHARGE     Medication List        CONTINUE taking these medications        Instructions   acyclovir 400 MG tablet  Commonly known as: Zovirax   Take 400 mg by mouth.  Dose: 400 mg     allopurinol 300 MG Tabs  Commonly known as: Zyloprim   Take 150 mg by mouth every day.  Dose: 150 mg     apixaban 5mg Tabs  Commonly known as: Eliquis   Take 5 mg by mouth.  Dose: 5 mg     * atorvastatin 80 MG tablet  Commonly known as: Lipitor   Take 80 mg by mouth.  Dose: 80 mg     * atorvastatin 40 MG Tabs  Commonly known as: Lipitor   Take 2  Tablets by mouth every day.  Dose: 80 mg     budesonide-formoterol 160-4.5 MCG/ACT Aero  Commonly known as: Symbicort   Inhale 2 puffs into the lungs 2 (two) times a day.     calcitRIOL 0.25 MCG Caps  Commonly known as: Rocaltrol   Take 0.25 mcg by mouth every day.  Dose: 0.25 mcg     Calcium Carbonate 1500 (600 Ca) MG Tabs   Take 1,500 mg by mouth.  Dose: 1,500 mg     cyanocobalamin 500 MCG tablet  Commonly known as: Vitamin B12   Take 500 mcg by mouth every day.  Dose: 500 mcg     Darbepoetin Mathew 10 MCG/0.4ML Sosy   Inject 100 mcg under the skin.  Dose: 100 mcg     denosumab 60 MG/ML Sosy injection  Commonly known as: Prolia   Inject 60 mg under the skin.  Dose: 60 mg     Entresto 24-26 MG Tabs  Generic drug: sacubitril-valsartan   Take 1 Tablet by mouth 2 times a day.  Dose: 1 Tablet     famotidine 20 MG Tabs  Commonly known as: Pepcid   Take 1 Tablet by mouth 2 times a day.  Dose: 1 Tablet     ferrous sulfate 325 (65 Fe) MG EC tablet   Take 325 mg by mouth every day.  Dose: 325 mg     fluticasone 50 MCG/ACT nasal spray  Commonly known as: Flonase   Administer 1 Spray into affected nostril(S) every day.  Dose: 1 Spray     furosemide 20 MG Tabs  Commonly known as: Lasix   Take 20 mg by mouth.  Dose: 20 mg     gabapentin 400 MG Caps  Commonly known as: Neurontin   Take 400 mg by mouth.  Dose: 400 mg     * glimepiride 1 MG tablet  Commonly known as: Amaryl   Take 1 tablet (1 mg total) by mouth 2 (two) times a day as needed (usually takes 1mg at hs and 4mg in am).     * glimepiride 4 MG Tabs  Commonly known as: Amaryl   Take 1 tablet (4 mg total) by mouth 2 (two) times a day as needed.     levothyroxine 75 MCG Tabs  Commonly known as: Synthroid   Take 75 mcg by mouth every day.  Dose: 75 mcg     loperamide 2 MG Caps  Commonly known as: Imodium   Take 2 mg by mouth.  Dose: 2 mg     Magnesium 400 MG Tabs   Take 1 Tablet by mouth 2 times a day.  Dose: 1 Tablet     MELATONIN PO   Take 5 mg by mouth.  Dose: 5 mg      metoprolol SR 50 MG Tb24  Commonly known as: Toprol XL   Take 50 mg by mouth every day.  Dose: 50 mg     oxybutynin 5 MG Tabs  Commonly known as: Ditropan   Take 1 Tablet by mouth every day.  Dose: 1 Tablet     pantoprazole 40 MG Tbec  Commonly known as: Protonix   TAKE 1 TABLET BY MOUTH TWICE  DAILY 1/2 HOUR BEFORE BREAKFAST  AND DINNER     SITagliptin 50 MG Tabs  Commonly known as: Januvia   Take 50 mg by mouth every day.  Dose: 50 mg     zinc sulfate 220 (50 Zn) MG Caps  Commonly known as: Zincate   Take 220 mg by mouth every day.  Dose: 220 mg           * This list has 4 medication(s) that are the same as other medications prescribed for you. Read the directions carefully, and ask your doctor or other care provider to review them with you.                STOP taking these medications      Farxiga 10 MG Tabs  Generic drug: dapagliflozin propanediol              Allergies  Allergies   Allergen Reactions    Tolmetin Unspecified     Other reaction(s): Other (See Comments)  Other reaction(s): Other (See Comments)  Drop  In platelets  CKD  Other reaction(s): Thrombocytopenia  Drop  In platelets  CKD  Drop  In platelets  CKD  Drop  In platelets  CKD      2,4-D Dimethylamine, Amisol Unspecified     Other reaction(s): Unknown/Patient and Family Unable to Define      Diagnostic X-Ray Materials Unspecified     Other reaction(s): Other (See Comments)  Has only 1 functioning kidney  Other reaction(s): Other (See Comments)  Has only 1 functioning kidney  Has only 1 functioning kidney  Other reaction(s): Other (See Comments)  Has only 1 functioning kidney  Has only 1 functioning kidney  Has only 1 functioning kidney  Other reaction(s): Other (See Comments)  Has only 1 functioning kidney  Other reaction(s): Other (See Comments)  Has only 1 functioning kidney  Has only 1 functioning kidney  Has only 1 functioning kidney      Ketorolac      Other reaction(s): Other (See Comments), Other (See Comments)  Other reaction(s): Other  (See Comments)  CKD, thrombocytopenia  CKD, thrombocytopenia  Other reaction(s): Other (See Comments)  CKD, thrombocytopenia  CKD, thrombocytopenia  CKD, thrombocytopenia  CKD, thrombocytopenia  Other reaction(s): Other (See Comments)  CKD, thrombocytopenia  CKD, thrombocytopenia  CKD, thrombocytopenia  CKD, thrombocytopenia      Minocycline Unspecified     Other reaction(s): Delerium/Confusion/Psychosis, Other (See Comments)  Other reaction(s): Other (See Comments)  Severe dizziness  Severe dizziness  Severe dizziness  Severe dizziness  Severe dizziness  Severe dizziness  Severe dizziness  Severe dizziness  Severe dizziness  Severe dizziness      Salicylates Unspecified     Pt states avoid due to blood thinning properties  Pt states avoid due to blood thinning properties      Diphenhydramine      Per pt wife can take orally, but IV was negative in hosptial   Per pt wife can take orally, but IV was negative in hosptial       Metoclopramide      Per pt wife  Per pt wife      Nsaids      intolerance    Ondansetron      Per pt wife   Per pt wife       Prochlorperazine      Per pt wife  Per pt wife      Sulfamethoxazole W-Trimethoprim Unspecified     Other reaction(s): Other (See Comments)  Other reaction(s): Other (See Comments)  Hypoglycemia  Hypoglycemia  Hypoglycemia  Hypoglycemia         DIET  Orders Placed This Encounter   Procedures    Diet Order Diet: Consistent CHO (Diabetic); Second Modifier: (optional): Cardiac     Standing Status:   Standing     Number of Occurrences:   1     Order Specific Question:   Diet:     Answer:   Consistent CHO (Diabetic) [4]     Order Specific Question:   Second Modifier: (optional)     Answer:   Cardiac [6]       ACTIVITY  As tolerated.  Weight bearing as tolerated         PROCEDURES  Echocardiogram    LABORATORY  Lab Results   Component Value Date    SODIUM 142 09/20/2023    POTASSIUM 3.9 09/20/2023    CHLORIDE 112 09/20/2023    CO2 19 (L) 09/20/2023    GLUCOSE 131 (H) 09/20/2023     BUN 30 (H) 09/20/2023    CREATININE 1.48 (H) 09/20/2023        Lab Results   Component Value Date    WBC 6.1 09/17/2023    HEMOGLOBIN 12.6 (L) 09/17/2023    HEMATOCRIT 37.9 (L) 09/17/2023    PLATELETCT 70 (L) 09/17/2023        Total time of the discharge process exceeds 37 minutes.

## 2023-09-20 VITALS
HEART RATE: 97 BPM | SYSTOLIC BLOOD PRESSURE: 126 MMHG | TEMPERATURE: 98.1 F | RESPIRATION RATE: 16 BRPM | BODY MASS INDEX: 29.4 KG/M2 | OXYGEN SATURATION: 94 % | DIASTOLIC BLOOD PRESSURE: 83 MMHG | WEIGHT: 160.72 LBS

## 2023-09-20 LAB
ALBUMIN SERPL BCP-MCNC: 3.6 G/DL (ref 3.2–4.9)
ALBUMIN/GLOB SERPL: 1.3 G/DL
ALP SERPL-CCNC: 104 U/L (ref 30–99)
ALT SERPL-CCNC: 11 U/L (ref 2–50)
ANION GAP SERPL CALC-SCNC: 11 MMOL/L (ref 7–16)
AST SERPL-CCNC: 16 U/L (ref 12–45)
BILIRUB SERPL-MCNC: 1.1 MG/DL (ref 0.1–1.5)
BUN SERPL-MCNC: 30 MG/DL (ref 8–22)
CALCIUM ALBUM COR SERPL-MCNC: 9.1 MG/DL (ref 8.5–10.5)
CALCIUM SERPL-MCNC: 8.8 MG/DL (ref 8.5–10.5)
CHLORIDE SERPL-SCNC: 112 MMOL/L (ref 96–112)
CO2 SERPL-SCNC: 19 MMOL/L (ref 20–33)
CREAT SERPL-MCNC: 1.48 MG/DL (ref 0.5–1.4)
GFR SERPLBLD CREATININE-BSD FMLA CKD-EPI: 48 ML/MIN/1.73 M 2
GLOBULIN SER CALC-MCNC: 2.7 G/DL (ref 1.9–3.5)
GLUCOSE BLD STRIP.AUTO-MCNC: 110 MG/DL (ref 65–99)
GLUCOSE BLD STRIP.AUTO-MCNC: 129 MG/DL (ref 65–99)
GLUCOSE BLD STRIP.AUTO-MCNC: 133 MG/DL (ref 65–99)
GLUCOSE SERPL-MCNC: 131 MG/DL (ref 65–99)
MAGNESIUM SERPL-MCNC: 2 MG/DL (ref 1.5–2.5)
POTASSIUM SERPL-SCNC: 3.9 MMOL/L (ref 3.6–5.5)
PROT SERPL-MCNC: 6.3 G/DL (ref 6–8.2)
SODIUM SERPL-SCNC: 142 MMOL/L (ref 135–145)
VIT B12 SERPL-MCNC: 388 PG/ML (ref 211–911)

## 2023-09-20 PROCEDURE — 82607 VITAMIN B-12: CPT

## 2023-09-20 PROCEDURE — 700102 HCHG RX REV CODE 250 W/ 637 OVERRIDE(OP)

## 2023-09-20 PROCEDURE — 99239 HOSP IP/OBS DSCHRG MGMT >30: CPT | Mod: GC | Performed by: INTERNAL MEDICINE

## 2023-09-20 PROCEDURE — 80053 COMPREHEN METABOLIC PANEL: CPT

## 2023-09-20 PROCEDURE — 700102 HCHG RX REV CODE 250 W/ 637 OVERRIDE(OP): Performed by: STUDENT IN AN ORGANIZED HEALTH CARE EDUCATION/TRAINING PROGRAM

## 2023-09-20 PROCEDURE — 83735 ASSAY OF MAGNESIUM: CPT

## 2023-09-20 PROCEDURE — A9270 NON-COVERED ITEM OR SERVICE: HCPCS | Performed by: STUDENT IN AN ORGANIZED HEALTH CARE EDUCATION/TRAINING PROGRAM

## 2023-09-20 PROCEDURE — 99204 OFFICE O/P NEW MOD 45 MIN: CPT | Performed by: INTERNAL MEDICINE

## 2023-09-20 PROCEDURE — 82962 GLUCOSE BLOOD TEST: CPT

## 2023-09-20 PROCEDURE — A9270 NON-COVERED ITEM OR SERVICE: HCPCS

## 2023-09-20 PROCEDURE — 97165 OT EVAL LOW COMPLEX 30 MIN: CPT

## 2023-09-20 PROCEDURE — G0378 HOSPITAL OBSERVATION PER HR: HCPCS

## 2023-09-20 RX ORDER — ATORVASTATIN CALCIUM 40 MG/1
80 TABLET, FILM COATED ORAL DAILY
Qty: 30 TABLET | Refills: 0 | Status: SHIPPED | OUTPATIENT
Start: 2023-09-20

## 2023-09-20 RX ORDER — ACETAMINOPHEN 325 MG/1
650 TABLET ORAL EVERY 4 HOURS PRN
Status: DISCONTINUED | OUTPATIENT
Start: 2023-09-20 | End: 2023-09-20 | Stop reason: HOSPADM

## 2023-09-20 RX ADMIN — MOMETASONE FUROATE AND FORMOTEROL FUMARATE DIHYDRATE 2 PUFF: 200; 5 AEROSOL RESPIRATORY (INHALATION) at 06:14

## 2023-09-20 RX ADMIN — ACETAMINOPHEN 650 MG: 325 TABLET, FILM COATED ORAL at 12:22

## 2023-09-20 RX ADMIN — LOPERAMIDE HYDROCHLORIDE 2 MG: 2 CAPSULE ORAL at 06:20

## 2023-09-20 RX ADMIN — ATORVASTATIN CALCIUM 80 MG: 80 TABLET, FILM COATED ORAL at 06:12

## 2023-09-20 RX ADMIN — ALLOPURINOL 150 MG: 300 TABLET ORAL at 06:11

## 2023-09-20 RX ADMIN — LEVOTHYROXINE SODIUM 75 MCG: 0.07 TABLET ORAL at 06:12

## 2023-09-20 RX ADMIN — APIXABAN 5 MG: 5 TABLET, FILM COATED ORAL at 09:03

## 2023-09-20 RX ADMIN — METOPROLOL SUCCINATE 50 MG: 50 TABLET, EXTENDED RELEASE ORAL at 06:13

## 2023-09-20 RX ADMIN — LOPERAMIDE HYDROCHLORIDE 2 MG: 2 CAPSULE ORAL at 09:03

## 2023-09-20 RX ADMIN — CALCITRIOL CAPSULES 0.25 MCG 0.25 MCG: 0.25 CAPSULE ORAL at 06:12

## 2023-09-20 ASSESSMENT — COGNITIVE AND FUNCTIONAL STATUS - GENERAL
DAILY ACTIVITIY SCORE: 22
HELP NEEDED FOR BATHING: A LITTLE
SUGGESTED CMS G CODE MODIFIER DAILY ACTIVITY: CJ
TOILETING: A LITTLE

## 2023-09-20 ASSESSMENT — ACTIVITIES OF DAILY LIVING (ADL): TOILETING: INDEPENDENT

## 2023-09-20 ASSESSMENT — PAIN DESCRIPTION - PAIN TYPE: TYPE: ACUTE PAIN

## 2023-09-20 NOTE — CARE PLAN
The patient is Stable - Low risk of patient condition declining or worsening    Shift Goals  Clinical Goals: Get echo, monitor heart rhythm  Patient Goals: Discharge  Family Goals: DISCHARGE      Problem: Knowledge Deficit - Standard  Goal: Patient and family/care givers will demonstrate understanding of plan of care, disease process/condition, diagnostic tests and medications  Outcome: Progressing     Problem: Fall Risk  Goal: Patient will remain free from falls  Outcome: Progressing     Problem: Psychosocial  Goal: Patient's level of anxiety will decrease  Outcome: Progressing  Goal: Patient's ability to verbalize feelings about condition will improve  Outcome: Progressing  Goal: Patient's ability to re-evaluate and adapt role responsibilities will improve  Outcome: Progressing  Goal: Patient and family will demonstrate ability to cope with life altering diagnosis and/or procedure  Outcome: Progressing  Goal: Spiritual and cultural needs incorporated into hospitalization  Outcome: Progressing     Problem: Communication  Goal: The ability to communicate needs accurately and effectively will improve  Outcome: Progressing     Problem: Hemodynamics  Goal: Patient's hemodynamics, fluid balance and neurologic status will be stable or improve  Outcome: Progressing     Problem: Respiratory  Goal: Patient will achieve/maintain optimum respiratory ventilation and gas exchange  Outcome: Progressing     Problem: Urinary Elimination  Goal: Establish and maintain regular urinary output  Outcome: Progressing     Problem: Gastrointestinal Irritability  Goal: Diarrhea will be absent or improved  Outcome: Progressing     Problem: Self Care  Goal: Patient will have the ability to perform ADLs independently or with assistance (bathe, groom, dress, toilet and feed)  Outcome: Progressing

## 2023-09-20 NOTE — CARE PLAN
The patient is Stable - Low risk of patient condition declining or worsening    Shift Goals  Clinical Goals: Get echo, monitor heart rhythm  Patient Goals: Discharge  Family Goals: DISCHARGE    Progress made toward(s) clinical / shift goals:      Problem: Knowledge Deficit - Standard  Goal: Patient and family/care givers will demonstrate understanding of plan of care, disease process/condition, diagnostic tests and medications  Outcome: Progressing     Problem: Fall Risk  Goal: Patient will remain free from falls  Outcome: Progressing       Patient is not progressing towards the following goals:

## 2023-09-20 NOTE — THERAPY
Occupational Therapy   Initial Evaluation     Patient Name: Jasper Sears  Age:  77 y.o., Sex:  male  Medical Record #: [id number]  Today's Date: 9/20/2023     Precautions  Precautions: Fall Risk    Assessment  Patient is 77 y.o. male afib RVR 2/2 hypovolemia and N/V/D. PMHx of HFrEF 25% and multiple myeloma. Completed ADLs/txfs with SPV and functional ambulation w/o AD and close SPV. Reports lives w/wife who can assist PRN 24/7. Patient will not be actively followed for occupational therapy services at this time, however may be seen if requested by physician for 1 more visit within 30 days to address any discharge or equipment needs.     Plan    Occupational Therapy Initial Treatment Plan   Duration: Evaluation only    DC Equipment Recommendations: None  Discharge Recommendations: Anticipate that the patient will have no further occupational therapy needs after discharge from the hospital (as long as wife can assist PRN)      Objective     09/20/23 0920   Prior Living Situation   Prior Services Home-Independent   Housing / Facility 2 Story House  (has to go upstairs for bedroom and BR)   Steps Into Home 0   Steps In Home   (FOS)   Bathroom Set up Bathtub / Shower Combination;Shower Chair   Equipment Owned Single Point Cane;Front-Wheel Walker;Tub / Shower Seat   Lives with - Patient's Self Care Capacity Spouse   Comments Reports lives w/wife who can assist PRN 24/7.   Prior Level of ADL Function   Self Feeding Independent   Grooming / Hygiene Independent   Bathing Independent   Dressing Independent   Toileting Independent   Prior Level of IADL Function   Medication Management Independent   Laundry Independent   Kitchen Mobility Independent   Finances Independent   Home Management Independent   Shopping Independent   Prior Level Of Mobility Independent Without Device in Home;Independent With Device in Community  (SPC)   Driving / Transportation Driving Independent   Occupation (Pre-Hospital Vocational) Retired Due To  Age   Precautions   Precautions Fall Risk   Vitals   O2 Delivery Device None - Room Air   Pain 0 - 10 Group   Therapist Pain Assessment Post Activity Pain Same as Prior to Activity;During Activity;Nurse Notified;0   Cognition    Cognition / Consciousness WDL   Comments pleasant and cooperative; ready to return home   Passive ROM Upper Body   Passive ROM Upper Body WDL   Active ROM Upper Body   Active ROM Upper Body  WDL   Strength Upper Body   Upper Body Strength  WDL   Comments reports feels it is near baseline   Coordination Upper Body   Coordination WDL   Balance Assessment   Sitting Balance (Static) Good   Sitting Balance (Dynamic) Fair +   Standing Balance (Static) Fair   Standing Balance (Dynamic) Fair   Weight Shift Sitting Good   Weight Shift Standing Fair   Comments w/o AD   Bed Mobility    Supine to Sit Supervised   Sit to Supine Supervised   Scooting Supervised   Comments HOB elevated   ADL Assessment   Eating Supervision   Grooming Supervision   Lower Body Dressing Supervision   Toileting Supervision  (standing to empty ostomy)   Functional Mobility   Sit to Stand Supervised   Bed, Chair, Wheelchair Transfer Supervised   Toilet Transfers Supervised   Transfer Method Stand Step   Mobility w/o AD in room   Edema / Skin Assessment   Edema / Skin  Not Assessed   Activity Tolerance   Comments no c/o fatigue   Education Group   Education Provided Role of Occupational Therapist   Role of Occupational Therapist Patient Response Patient;Family;Acceptance;Explanation;Verbal Demonstration;Reinforcement Needed

## 2023-09-20 NOTE — CONSULTS
Reason for Consult:  Asked by Dr Antonio Addison M.D. to see this patient with concern for severe stenosis  Patient's PCP: Pcp Not In Computer    CC: Tachycardia with recent GI illness    HPI: This is a 77-year-old gentleman who lives in [place] with a long history of ischemic cardiomyopathy moderate aortic stenosis arrhythmia status post cardioversions who presents to [facility] where he has a denture.  He believes he had food poisoning and had noted tachycardia with the outside hospital he was found to have A-fib with RVR here is noted to have atrial flutter rate controlled.  Echocardiogram shows severely was ejection fraction with concern for low-flow low gradient severe AS.  He follows closely with his cardiology team in [place].    Medications / Drug list prior to admission:  No current facility-administered medications on file prior to encounter.     Current Outpatient Medications on File Prior to Encounter   Medication Sig Dispense Refill    MELATONIN PO Take 5 mg by mouth.      acyclovir (ZOVIRAX) 400 MG tablet Take 400 mg by mouth.      allopurinol (ZYLOPRIM) 300 MG Tab Take 150 mg by mouth every day.      apixaban (ELIQUIS) 5mg Tab Take 5 mg by mouth.      atorvastatin (LIPITOR) 80 MG tablet Take 80 mg by mouth.      budesonide-formoterol (SYMBICORT) 160-4.5 MCG/ACT Aerosol Inhale 2 puffs into the lungs 2 (two) times a day.      calcitRIOL (ROCALTROL) 0.25 MCG Cap Take 0.25 mcg by mouth every day.      Calcium Carbonate 1500 (600 Ca) MG Tab Take 1,500 mg by mouth.      cyanocobalamin (VITAMIN B12) 500 MCG tablet Take 500 mcg by mouth every day.      dapagliflozin propanediol (FARXIGA) 10 MG Tab Take 10 mg by mouth every day.      Darbepoetin Mathew 10 MCG/0.4ML Solution Prefilled Syringe Inject 100 mcg under the skin.      denosumab (PROLIA) 60 MG/ML Solution Prefilled Syringe injection Inject 60 mg under the skin.      famotidine (PEPCID) 20 MG Tab Take 1 Tablet by mouth 2 times a day.       ferrous sulfate 325 (65 Fe) MG EC tablet Take 325 mg by mouth every day.      fluticasone (FLONASE) 50 MCG/ACT nasal spray Administer 1 Spray into affected nostril(S) every day.      furosemide (LASIX) 20 MG Tab Take 20 mg by mouth.      gabapentin (NEURONTIN) 400 MG Cap Take 400 mg by mouth.      glimepiride (AMARYL) 1 MG tablet Take 1 tablet (1 mg total) by mouth 2 (two) times a day as needed (usually takes 1mg at hs and 4mg in am).      glimepiride (AMARYL) 4 MG Tab Take 1 tablet (4 mg total) by mouth 2 (two) times a day as needed.      levothyroxine (SYNTHROID) 75 MCG Tab Take 75 mcg by mouth every day.      loperamide (IMODIUM) 2 MG Cap Take 2 mg by mouth.      Magnesium 400 MG Tab Take 1 Tablet by mouth 2 times a day.      metoprolol SR (TOPROL XL) 50 MG TABLET SR 24 HR Take 50 mg by mouth every day.      oxybutynin (DITROPAN) 5 MG Tab Take 1 Tablet by mouth every day.      pantoprazole (PROTONIX) 40 MG Tablet Delayed Response TAKE 1 TABLET BY MOUTH TWICE  DAILY 1/2 HOUR BEFORE BREAKFAST  AND DINNER      sacubitril-valsartan (ENTRESTO) 24-26 MG Tab Take 1 Tablet by mouth 2 times a day.      SITagliptin (JANUVIA) 50 MG Tab Take 50 mg by mouth every day.      zinc sulfate (ZINCATE) 220 (50 Zn) MG Cap Take 220 mg by mouth every day.         Current list of administered Medications:    Current Facility-Administered Medications:     allopurinol (Zyloprim) tablet 150 mg, 150 mg, Oral, DAILY, Pedro Ramirez M.D., 150 mg at 09/20/23 0611    apixaban (Eliquis) tablet 5 mg, 5 mg, Oral, BID, Pedro Ramirez M.D., 5 mg at 09/19/23 0500    atorvastatin (Lipitor) tablet 80 mg, 80 mg, Oral, DAILY, Pedro Ramirez M.D., 80 mg at 09/20/23 0612    calcitRIOL (Rocaltrol) capsule 0.25 mcg, 0.25 mcg, Oral, DAILY, Pedro Ramirez M.D., 0.25 mcg at 09/20/23 0612    gabapentin (Neurontin) capsule 400 mg, 400 mg, Oral, Q EVENING, Pedro Ramirez M.D., 400 mg at 09/19/23 1747    levothyroxine (Synthroid) tablet 75 mcg, 75 mcg, Oral,  DAILY, Pedro Ramirez M.D., 75 mcg at 09/20/23 0612    metoprolol SR (Toprol XL) tablet 50 mg, 50 mg, Oral, DAILY, Pedro Ramirez M.D., 50 mg at 09/20/23 0613    mometasone-formoterol (Dulera) 200-5 MCG/ACT inhaler 2 Puff, 2 Puff, Inhalation, BID, Pedro Ramirez M.D., 2 Puff at 09/20/23 0614    LORazepam (Ativan) injection 0.5 mg, 0.5 mg, Intravenous, Q4HRS PRN, Ranjan Mir P.A.-C.    loperamide (Imodium) capsule 2 mg, 2 mg, Oral, 4X/DAY PRN, Johnathan Hair D.OMarcello, 2 mg at 09/20/23 0620    Metoprolol Tartrate (Lopressor) injection 5 mg, 5 mg, Intravenous, Q5 MIN PRN, Pedro Ramirez M.D.    insulin regular (HumuLIN R,NovoLIN R) injection, 1-6 Units, Subcutaneous, 4X/DAY ACHS **AND** POC blood glucose manual result, , , Q AC AND BEDTIME(S) **AND** NOTIFY MD and PharmD, , , Once **AND** Administer 20 grams of glucose (approximately 8 ounces of fruit juice) every 15 minutes PRN FSBG less than 70 mg/dL, , , PRN **AND** dextrose 50% (D50W) injection 25 g, 25 g, Intravenous, Q15 MIN PRN, Pedro Ramirez M.D.    Past Medical History:   Diagnosis Date    A-fib (HCC)     Congestive heart failure (HCC)     Diabetes (HCC)     Multiple myeloma (HCC)     Sleep apnea      Past surgical history includes CABG    Patient family history was personally reviewed, no pertinent family history to current presentation    Social History     Tobacco Use    Smoking status: Never    Smokeless tobacco: Never   Vaping Use    Vaping Use: Never used   Substance Use Topics    Alcohol use: Never    Drug use: Never       ALLERGIES:  Allergies   Allergen Reactions    Tolmetin Unspecified     Other reaction(s): Other (See Comments)  Other reaction(s): Other (See Comments)  Drop  In platelets  CKD  Other reaction(s): Thrombocytopenia  Drop  In platelets  CKD  Drop  In platelets  CKD  Drop  In platelets  CKD      2,4-D Dimethylamine, Amisol Unspecified     Other reaction(s): Unknown/Patient and Family Unable to Define      Diagnostic X-Ray  Materials Unspecified     Other reaction(s): Other (See Comments)  Has only 1 functioning kidney  Other reaction(s): Other (See Comments)  Has only 1 functioning kidney  Has only 1 functioning kidney  Other reaction(s): Other (See Comments)  Has only 1 functioning kidney  Has only 1 functioning kidney  Has only 1 functioning kidney  Other reaction(s): Other (See Comments)  Has only 1 functioning kidney  Other reaction(s): Other (See Comments)  Has only 1 functioning kidney  Has only 1 functioning kidney  Has only 1 functioning kidney      Ketorolac      Other reaction(s): Other (See Comments), Other (See Comments)  Other reaction(s): Other (See Comments)  CKD, thrombocytopenia  CKD, thrombocytopenia  Other reaction(s): Other (See Comments)  CKD, thrombocytopenia  CKD, thrombocytopenia  CKD, thrombocytopenia  CKD, thrombocytopenia  Other reaction(s): Other (See Comments)  CKD, thrombocytopenia  CKD, thrombocytopenia  CKD, thrombocytopenia  CKD, thrombocytopenia      Minocycline Unspecified     Other reaction(s): Delerium/Confusion/Psychosis, Other (See Comments)  Other reaction(s): Other (See Comments)  Severe dizziness  Severe dizziness  Severe dizziness  Severe dizziness  Severe dizziness  Severe dizziness  Severe dizziness  Severe dizziness  Severe dizziness  Severe dizziness      Salicylates Unspecified     Pt states avoid due to blood thinning properties  Pt states avoid due to blood thinning properties      Diphenhydramine      Per pt wife can take orally, but IV was negative in hosptial   Per pt wife can take orally, but IV was negative in hosptial       Metoclopramide      Per pt wife  Per pt wife      Nsaids      intolerance    Ondansetron      Per pt wife   Per pt wife       Prochlorperazine      Per pt wife  Per pt wife      Sulfamethoxazole W-Trimethoprim Unspecified     Other reaction(s): Other (See Comments)  Other reaction(s): Other (See  Comments)  Hypoglycemia  Hypoglycemia  Hypoglycemia  Hypoglycemia         Review of systems:  A complete review of symptoms was reviewed with patient. This is reviewed in H&P and PMH. ALL OTHERS reviewed and negative    Physical exam:  Patient Vitals for the past 24 hrs:   BP Temp Temp src Pulse Resp SpO2   09/20/23 0809 126/83 36.7 °C (98.1 °F) Temporal 97 16 94 %   09/20/23 0613 127/81 -- -- 94 -- --   09/20/23 0500 126/80 36.6 °C (97.9 °F) Temporal 90 17 --   09/20/23 0100 123/80 36.7 °C (98.1 °F) Temporal 99 18 93 %   09/19/23 2000 121/81 36.9 °C (98.4 °F) Temporal 98 18 91 %   09/19/23 1218 124/81 36.6 °C (97.8 °F) Temporal 87 18 92 %     General: No acute distress.   EYES: no jaundice  HEENT: OP clear   Neck:  No JVD.   CVS:  [  RRR.   Resp: Normal respiratory effort,   Abdomen: ND,  Skin: Grossly nothing acute no obvious rashes  Neurological: Alert, Moves all extremities  Extremities:   [  no edema. No cyanosis.       Data:  Laboratory studies personally reviewed by me:  Recent Results (from the past 24 hour(s))   POCT glucose device results    Collection Time: 09/19/23  8:32 AM   Result Value Ref Range    POC Glucose, Blood 127 (H) 65 - 99 mg/dL   EC-ECHOCARDIOGRAM COMPLETE W/O CONT    Collection Time: 09/19/23 12:13 PM   Result Value Ref Range    Eject.Frac. MOD BP 26.7     Eject.Frac. MOD 4C 27.53     Eject.Frac. MOD 2C 23.09     Left Ventrical Ejection Fraction 20    POCT glucose device results    Collection Time: 09/19/23  1:29 PM   Result Value Ref Range    POC Glucose, Blood 136 (H) 65 - 99 mg/dL   POCT glucose device results    Collection Time: 09/19/23  8:54 PM   Result Value Ref Range    POC Glucose, Blood 141 (H) 65 - 99 mg/dL   Comp Metabolic Panel    Collection Time: 09/20/23  1:44 AM   Result Value Ref Range    Sodium 142 135 - 145 mmol/L    Potassium 3.9 3.6 - 5.5 mmol/L    Chloride 112 96 - 112 mmol/L    Co2 19 (L) 20 - 33 mmol/L    Anion Gap 11.0 7.0 - 16.0    Glucose 131 (H) 65 - 99 mg/dL     Bun 30 (H) 8 - 22 mg/dL    Creatinine 1.48 (H) 0.50 - 1.40 mg/dL    Calcium 8.8 8.5 - 10.5 mg/dL    Correct Calcium 9.1 8.5 - 10.5 mg/dL    AST(SGOT) 16 12 - 45 U/L    ALT(SGPT) 11 2 - 50 U/L    Alkaline Phosphatase 104 (H) 30 - 99 U/L    Total Bilirubin 1.1 0.1 - 1.5 mg/dL    Albumin 3.6 3.2 - 4.9 g/dL    Total Protein 6.3 6.0 - 8.2 g/dL    Globulin 2.7 1.9 - 3.5 g/dL    A-G Ratio 1.3 g/dL   MAGNESIUM    Collection Time: 09/20/23  1:44 AM   Result Value Ref Range    Magnesium 2.0 1.5 - 2.5 mg/dL   VITAMIN B12    Collection Time: 09/20/23  1:44 AM   Result Value Ref Range    Vitamin B12 -True Cobalamin 388 211 - 911 pg/mL   ESTIMATED GFR    Collection Time: 09/20/23  1:44 AM   Result Value Ref Range    GFR (CKD-EPI) 48 (A) >60 mL/min/1.73 m 2       Imaging:  EC-ECHOCARDIOGRAM COMPLETE W/O CONT   Final Result      DX-CHEST-PORTABLE (1 VIEW)   Final Result      Stable minimal right basilar opacification      DX-CHEST-PORTABLE (1 VIEW)   Final Result      Small amount of right medial hazy basilar opacity, which could represent a small amount of parenchymal opacification or layering pleural fluid.              EKG tracings personally reviewed by me                 Echocardiogram images personally reviewed by me show         Transthoracic  Echo Report        Echocardiography Laboratory     CONCLUSIONS  No prior study is available for comparison.   Mild concentric left ventricular hypertrophy. Severely reduced left   ventricular systolic function. The left ventricular ejection fraction   is visually estimated to be 20%.   Reduced right ventricular systolic function. The right ventricle is   dilated.  Calcified aortic valve leaflets. Cannot rule out low flow low gradient   severe aortic stenosis.  Estimated right ventricular systolic pressure is 40 mmHg.  The aortic root has a diameter of 3.7 cm. The ascending aorta is   dilated with a diameter of 3.6 cm.    DOPPLER  AV Peak Velocity                  2.5 m/s                  AV Peak Gradient                  25.6 mmHg               AV Mean Gradient                  13.7 mmHg     All pertinent features of laboratory and imaging reviewed including primary images where applicable      Principal Problem:    Atrial fibrillation with rapid ventricular response (HCC) (POA: Yes)  Active Problems:    Chronic systolic congestive heart failure (HCC) (POA: Unknown)    Diabetes mellitus (HCC) (POA: Unknown)    Nausea & vomiting (POA: Unknown)    Multiple myeloma (HCC) (POA: Unknown)    Diarrhea (POA: Unknown)  Resolved Problems:    * No resolved hospital problems. *      Assessment / Plan:  Atrial flutter now rate controlled on his long-term metoprolol  And anticoagulation is important  We would recommend EP evaluation and ablation when he gets back home    Probable severe aortic stenosis in the setting of severely reduced ejection fraction  Encouraged him to talk with his team about TAVR    He can be safely discharged with close follow-up in [place]      I personally discussed his case with  Dr Antonio Addison M.D.    No future appointments.    It is my pleasure to participate in the care of Mr. Sears.  Please do not hesitate to contact me with questions or concerns.    Kevin Ibanez MD PhD [facility]  Cardiologist [facility]    9/20/2023    Please note that this dictation was created using voice recognition software. There may be errors I did not discover before finalizing the note.

## 2023-09-20 NOTE — NON-PROVIDER
Medical Student PROGRESS NOTE    CC: Jasper Sears is a 76 YO male admitted on 9/17/2023 with atrial fibrillation w/ RVR secondary to hypovolemia from vomiting and diarrhea.    Consults:  Cardiology    OVERVIEW  Hospital Course:  Jasper Sears is a 76 YO male with past medical history of CHF with reduced ejection fraction of 25%, diabetes, multiple myeloma (s/p chemotherapy) who presented on 9/17/2023 from an outside facility with lower abdominal pain associated with nausea, vomiting and diarrhea. In the ER at outside facility, he had atrial fibrillation with rapid ventricular response with reports of a flutter and runs of V. tach.  CT of the abdomen pelvis did not reveal acute abdominal pathology. C. Diff PCR negative. In the ED, patient received IV fluids, magnesium, Zosyn, amiodarone 150 mg bolus, and metoprolol IV pushes.    SUBJECTIVE  Patient reports no significant changes since yesterday. His wife has talked to his cardiologist in [place] who read the results of the ECHO done here. She states that they noted the results are significantly different from April. They have an appointment set up for next week. ECHO results were discussed with patient, including EF of 20% and severe aortic stenosis. Patient is not experiencing chest pain or palpitations. His diarrhea is at baseline. Majority of history was taken from wife, as patient was in the bathroom.     ROS: All other systems were reviewed and otherwise negative aside from mentioned above    OBJECTIVE  Vital Signs: Last Filed Vitals Signs: 24 Hour Range  /83   Pulse 97   Temp 36.7 °C (98.1 °F) (Temporal)   Resp 16   Wt 72.9 kg (160 lb 11.5 oz)   SpO2 94%     Physical Examination:  General: A&Ox3, NAD, non-toxic  HEENT: No conjunctival injection, OP clear  Cardiovascular: RRR, no murmurs  Respiratory: CTAB, no wheezes, no accessory muscle use  Gastrointestinal: Soft, NT, ND  Extremities:  no JAVI  Skin: No rashes in visible areas, warm and  dry  Psychiatric: Mood/affect non-depressed, no delirium  Neurologic: Cranial nerves are grossly intact, sensation is symmetrical bilaterally    Laboratory Studies:  Recent Labs     09/17/23  1328 09/17/23  2343   WBC 8.4 6.1   RBC 4.12* 3.61*   HEMOGLOBIN 14.3 12.6*   HEMATOCRIT 43.7 37.9*   .1* 105.0*   MCH 34.7* 34.9*   RDW 14.4 55.9*   PLATELETCT 76* 70*   MPV 11.2* 11.4   NEUTSPOLYS  --  83.80*   LYMPHOCYTES  --  7.90*   MONOCYTES  --  5.90   EOSINOPHILS  --  1.20   BASOPHILS  --  0.50     Recent Labs     09/18/23  0550 09/19/23  0102 09/20/23  0144   SODIUM 140 140 142   POTASSIUM 4.0 4.2 3.9   CHLORIDE 111 111 112   CO2 14* 19* 19*   GLUCOSE 116* 119* 131*   BUN 33* 32* 30*   CREATININE 1.43* 1.74* 1.48*     EC-ECHOCARDIOGRAM COMPLETE W/O CONT   Final Result      DX-CHEST-PORTABLE (1 VIEW)   Final Result      Stable minimal right basilar opacification      DX-CHEST-PORTABLE (1 VIEW)   Final Result      Small amount of right medial hazy basilar opacity, which could represent a small amount of parenchymal opacification or layering pleural fluid.        Medications:  Medications were reviewed and updated     Assessment and Plan:  Jasper Sears is a 77 y.o. male w/ h/o CHF w/ reduced EF of 25%, diabetes, multiple myeloma admitted 9/17/2023 for a. fib w/ RVR  secondary to hypovolemia from diarrhea and vomiting.    Patient Active Hospital Problem List:       Atrial fibrillation with rapid ventricular response (HCC) (9/17/2023)           Assessment: Patient presented to outside facility for nausea, vomiting, and diarrhea and was found to be in a. fib w/ RVR likely secondary to hypovolemia. Patient has a long history of atrial fibrillation. Per chart review, amiodarone and metoprolol were discontinued due to QT prolongation and bradycardia while in sinus, respectively. On telemetry, patient was found to be in a. flutter. He denies any symptoms of chest pain or palpitations. ECHO demonstrated concern for  severe AS, which may precipitate his atrial flutter. Patient will follow-up with home cardiologist in [place] for further management.        Plan:   -Discharge   -Close follow-up with home cardiologist          Diarrhea (9/18/2023)           Assessment: Patient's output is at his baseline since his partial colectomy. He takes Imodium 3x/day at home.         Chronic systolic congestive heart failure (HCC) (9/18/2023)           Assessment: Patient is not in exacerbation and there are no signs of volume overload. ECHO results demonstrate severe AS.        Plan:               -Follow-up with home cardiologist          Diabetes mellitus (HCC) (9/18/2023)           Assessment: Last A1c 7.2 on 3/13/23.        Plan:   -Outpatient f/u   -Discontinue home Farxiga due to high output ostomy             Nausea & vomiting (9/18/2023)           Assessment: Likely secondary to food poisoning, resolved.                Multiple myeloma (HCC) (9/18/2023)           Assessment: History of MM.        Plan:               -Outpatient follow-up     Code: Full     Ingrid Sanchez, Student Medical Student III

## 2023-09-20 NOTE — PROGRESS NOTES
Discharge instructions reviewed and signed, all questions answered, PIV removed, no new medications.

## 2023-09-20 NOTE — CARE PLAN
The patient is Stable - Low risk of patient condition declining or worsening    Shift Goals  Clinical Goals: hemodynamic stability, safety  Patient Goals: cardio consult  Family Goals: cardio consult, discharge    PT medically cleared for discharge per MD.    Problem: Knowledge Deficit - Standard  Goal: Patient and family/care givers will demonstrate understanding of plan of care, disease process/condition, diagnostic tests and medications  Outcome: Met     Problem: Fall Risk  Goal: Patient will remain free from falls  Outcome: Met     Problem: Psychosocial  Goal: Patient's level of anxiety will decrease  Outcome: Met  Goal: Patient's ability to verbalize feelings about condition will improve  Outcome: Met  Goal: Patient's ability to re-evaluate and adapt role responsibilities will improve  Outcome: Met  Goal: Patient and family will demonstrate ability to cope with life altering diagnosis and/or procedure  Outcome: Met  Goal: Spiritual and cultural needs incorporated into hospitalization  Outcome: Met     Problem: Communication  Goal: The ability to communicate needs accurately and effectively will improve  Outcome: Met     Problem: Hemodynamics  Goal: Patient's hemodynamics, fluid balance and neurologic status will be stable or improve  Outcome: Met     Problem: Respiratory  Goal: Patient will achieve/maintain optimum respiratory ventilation and gas exchange  Outcome: Met     Problem: Urinary Elimination  Goal: Establish and maintain regular urinary output  Outcome: Met     Problem: Gastrointestinal Irritability  Goal: Diarrhea will be absent or improved  Outcome: Met     Problem: Self Care  Goal: Patient will have the ability to perform ADLs independently or with assistance (bathe, groom, dress, toilet and feed)  Outcome: Met     Problem: Pain - Standard  Goal: Alleviation of pain or a reduction in pain to the patient’s comfort goal  Outcome: Met

## 2023-09-21 LAB
BACTERIA STL CULT: NORMAL
E COLI SXT1+2 STL IA: NORMAL
SIGNIFICANT IND 70042: NORMAL
SITE SITE: NORMAL
SOURCE SOURCE: NORMAL